# Patient Record
Sex: MALE | Race: WHITE | NOT HISPANIC OR LATINO | Employment: OTHER | URBAN - NONMETROPOLITAN AREA
[De-identification: names, ages, dates, MRNs, and addresses within clinical notes are randomized per-mention and may not be internally consistent; named-entity substitution may affect disease eponyms.]

---

## 2023-02-02 PROBLEM — M25.521 RIGHT ELBOW PAIN: Status: ACTIVE | Noted: 2023-02-02

## 2023-02-02 PROBLEM — M79.645 THUMB PAIN, LEFT: Status: ACTIVE | Noted: 2023-02-02

## 2023-02-02 PROBLEM — Z95.810 CARDIAC DEFIBRILLATOR IN PLACE: Status: ACTIVE | Noted: 2023-02-02

## 2023-02-02 PROBLEM — R06.2 WHEEZING: Status: ACTIVE | Noted: 2023-02-02

## 2023-02-02 PROBLEM — L40.9 PSORIASIS: Status: ACTIVE | Noted: 2023-02-02

## 2023-02-02 PROBLEM — R35.0 INCREASED URINARY FREQUENCY: Status: ACTIVE | Noted: 2023-02-02

## 2023-02-02 PROBLEM — N32.81 OVERACTIVE BLADDER: Status: ACTIVE | Noted: 2023-02-02

## 2023-02-02 PROBLEM — I10 BENIGN HYPERTENSION: Status: ACTIVE | Noted: 2023-02-02

## 2023-02-02 PROBLEM — K13.0 LIP ULCERATION: Status: ACTIVE | Noted: 2023-02-02

## 2023-02-02 PROBLEM — M25.551 HIP PAIN, RIGHT: Status: ACTIVE | Noted: 2023-02-02

## 2023-02-02 PROBLEM — R39.9 URINARY SYMPTOM OR SIGN: Status: ACTIVE | Noted: 2023-02-02

## 2023-02-02 PROBLEM — M72.2 PLANTAR FASCIITIS: Status: ACTIVE | Noted: 2023-02-02

## 2023-02-02 PROBLEM — R06.02 SOB (SHORTNESS OF BREATH) ON EXERTION: Status: ACTIVE | Noted: 2023-02-02

## 2023-02-02 PROBLEM — L28.0 LICHENOID DERMATITIS: Status: ACTIVE | Noted: 2023-02-02

## 2023-02-02 PROBLEM — E78.5 DYSLIPIDEMIA: Status: ACTIVE | Noted: 2023-02-02

## 2023-02-02 PROBLEM — R25.2 MUSCLE CRAMPS: Status: ACTIVE | Noted: 2023-02-02

## 2023-02-02 PROBLEM — I50.9 CONGESTIVE HEART FAILURE (MULTI): Status: ACTIVE | Noted: 2023-02-02

## 2023-02-02 PROBLEM — H20.00 ACUTE IRITIS: Status: ACTIVE | Noted: 2023-02-02

## 2023-02-02 PROBLEM — R23.3 EASY BRUISING: Status: ACTIVE | Noted: 2023-02-02

## 2023-02-02 PROBLEM — L30.9 DERMATITIS: Status: ACTIVE | Noted: 2023-02-02

## 2023-02-02 PROBLEM — K21.9 GASTROESOPHAGEAL REFLUX DISEASE: Status: ACTIVE | Noted: 2023-02-02

## 2023-02-02 PROBLEM — Z95.810 S/P INTERNAL CARDIAC DEFIBRILLATOR PROCEDURE: Status: ACTIVE | Noted: 2023-02-02

## 2023-02-02 PROBLEM — H57.9 EYE PROBLEM: Status: ACTIVE | Noted: 2023-02-02

## 2023-02-02 PROBLEM — Z85.828 HISTORY OF BASAL CELL CARCINOMA OF SKIN: Status: ACTIVE | Noted: 2023-02-02

## 2023-02-02 PROBLEM — H33.20 RETINAL DETACHMENT: Status: ACTIVE | Noted: 2023-02-02

## 2023-02-02 PROBLEM — I42.8 NONISCHEMIC CARDIOMYOPATHY (MULTI): Status: ACTIVE | Noted: 2023-02-02

## 2023-02-02 PROBLEM — R21 SKIN RASH: Status: ACTIVE | Noted: 2023-02-02

## 2023-02-02 PROBLEM — E66.3 OVERWEIGHT WITH BODY MASS INDEX (BMI) OF 28 TO 28.9 IN ADULT: Status: ACTIVE | Noted: 2023-02-02

## 2023-02-02 PROBLEM — K13.0 CHAPPED LIPS: Status: ACTIVE | Noted: 2023-02-02

## 2023-02-02 PROBLEM — M77.9 TENDONITIS: Status: ACTIVE | Noted: 2023-02-02

## 2023-02-02 RX ORDER — OMEPRAZOLE 20 MG/1
CAPSULE, DELAYED RELEASE ORAL
COMMUNITY

## 2023-02-02 RX ORDER — PREDNISOLONE ACETATE 10 MG/ML
SUSPENSION/ DROPS OPHTHALMIC
COMMUNITY

## 2023-02-02 RX ORDER — DIGOXIN 125 MCG
1 TABLET ORAL DAILY
COMMUNITY
Start: 2013-09-05

## 2023-02-02 RX ORDER — ASPIRIN 81 MG/1
1 TABLET ORAL DAILY
COMMUNITY

## 2023-02-02 RX ORDER — CARVEDILOL 12.5 MG/1
1 TABLET ORAL
COMMUNITY

## 2023-02-02 RX ORDER — AMMONIUM LACTATE 12 G/100G
LOTION TOPICAL
COMMUNITY

## 2023-02-02 RX ORDER — CYCLOPENTOLATE HYDROCHLORIDE 10 MG/ML
SOLUTION/ DROPS OPHTHALMIC
COMMUNITY

## 2023-02-02 RX ORDER — TACROLIMUS 1 MG/G
OINTMENT TOPICAL
COMMUNITY
Start: 2021-11-23

## 2023-02-02 RX ORDER — EZETIMIBE 10 MG/1
1 TABLET ORAL DAILY
COMMUNITY

## 2023-02-02 RX ORDER — FLUOCINONIDE 0.5 MG/G
OINTMENT TOPICAL
COMMUNITY
Start: 2021-11-23

## 2023-02-02 RX ORDER — SPIRONOLACTONE 50 MG/1
1 TABLET, FILM COATED ORAL DAILY
COMMUNITY
Start: 2018-09-17 | End: 2024-01-05 | Stop reason: ALTCHOICE

## 2023-02-02 RX ORDER — ALBUTEROL SULFATE 90 UG/1
AEROSOL, METERED RESPIRATORY (INHALATION)
COMMUNITY
Start: 2020-05-12

## 2023-02-02 RX ORDER — MULTIVITAMIN
TABLET ORAL
COMMUNITY

## 2023-02-02 RX ORDER — CEPHALEXIN 500 MG/1
500 CAPSULE ORAL
COMMUNITY
End: 2023-06-01 | Stop reason: ALTCHOICE

## 2023-03-16 ENCOUNTER — OFFICE VISIT (OUTPATIENT)
Dept: PRIMARY CARE | Facility: CLINIC | Age: 83
End: 2023-03-16
Payer: MEDICARE

## 2023-03-16 VITALS
BODY MASS INDEX: 28.79 KG/M2 | TEMPERATURE: 97.6 F | HEART RATE: 67 BPM | WEIGHT: 168.6 LBS | OXYGEN SATURATION: 95 % | HEIGHT: 64 IN | SYSTOLIC BLOOD PRESSURE: 110 MMHG | DIASTOLIC BLOOD PRESSURE: 60 MMHG

## 2023-03-16 DIAGNOSIS — I50.9 CONGESTIVE HEART FAILURE, UNSPECIFIED HF CHRONICITY, UNSPECIFIED HEART FAILURE TYPE (MULTI): ICD-10-CM

## 2023-03-16 DIAGNOSIS — H20.00 ACUTE IRITIS: ICD-10-CM

## 2023-03-16 DIAGNOSIS — Z00.00 MEDICARE ANNUAL WELLNESS VISIT, SUBSEQUENT: Primary | ICD-10-CM

## 2023-03-16 PROBLEM — R23.3 EASY BRUISING: Status: RESOLVED | Noted: 2023-02-02 | Resolved: 2023-03-16

## 2023-03-16 PROBLEM — Z95.810 S/P INTERNAL CARDIAC DEFIBRILLATOR PROCEDURE: Status: RESOLVED | Noted: 2023-02-02 | Resolved: 2023-03-16

## 2023-03-16 PROBLEM — R39.9 URINARY SYMPTOM OR SIGN: Status: RESOLVED | Noted: 2023-02-02 | Resolved: 2023-03-16

## 2023-03-16 PROBLEM — R35.0 INCREASED URINARY FREQUENCY: Status: RESOLVED | Noted: 2023-02-02 | Resolved: 2023-03-16

## 2023-03-16 PROBLEM — R21 SKIN RASH: Status: RESOLVED | Noted: 2023-02-02 | Resolved: 2023-03-16

## 2023-03-16 PROBLEM — R06.2 WHEEZING: Status: RESOLVED | Noted: 2023-02-02 | Resolved: 2023-03-16

## 2023-03-16 PROBLEM — M79.645 THUMB PAIN, LEFT: Status: RESOLVED | Noted: 2023-02-02 | Resolved: 2023-03-16

## 2023-03-16 PROBLEM — M25.521 RIGHT ELBOW PAIN: Status: RESOLVED | Noted: 2023-02-02 | Resolved: 2023-03-16

## 2023-03-16 PROBLEM — N32.81 OVERACTIVE BLADDER: Status: RESOLVED | Noted: 2023-02-02 | Resolved: 2023-03-16

## 2023-03-16 PROBLEM — R06.02 SOB (SHORTNESS OF BREATH) ON EXERTION: Status: RESOLVED | Noted: 2023-02-02 | Resolved: 2023-03-16

## 2023-03-16 PROBLEM — L28.0 LICHENOID DERMATITIS: Status: RESOLVED | Noted: 2023-02-02 | Resolved: 2023-03-16

## 2023-03-16 PROBLEM — M72.2 PLANTAR FASCIITIS: Status: RESOLVED | Noted: 2023-02-02 | Resolved: 2023-03-16

## 2023-03-16 PROBLEM — M77.9 TENDONITIS: Status: RESOLVED | Noted: 2023-02-02 | Resolved: 2023-03-16

## 2023-03-16 PROBLEM — E66.3 OVERWEIGHT WITH BODY MASS INDEX (BMI) OF 28 TO 28.9 IN ADULT: Status: RESOLVED | Noted: 2023-02-02 | Resolved: 2023-03-16

## 2023-03-16 PROBLEM — M25.551 HIP PAIN, RIGHT: Status: RESOLVED | Noted: 2023-02-02 | Resolved: 2023-03-16

## 2023-03-16 PROBLEM — K13.0 LIP ULCERATION: Status: RESOLVED | Noted: 2023-02-02 | Resolved: 2023-03-16

## 2023-03-16 PROBLEM — K13.0 CHAPPED LIPS: Status: RESOLVED | Noted: 2023-02-02 | Resolved: 2023-03-16

## 2023-03-16 PROBLEM — H57.9 EYE PROBLEM: Status: RESOLVED | Noted: 2023-02-02 | Resolved: 2023-03-16

## 2023-03-16 PROBLEM — R25.2 MUSCLE CRAMPS: Status: RESOLVED | Noted: 2023-02-02 | Resolved: 2023-03-16

## 2023-03-16 PROBLEM — L30.9 DERMATITIS: Status: RESOLVED | Noted: 2023-02-02 | Resolved: 2023-03-16

## 2023-03-16 PROCEDURE — 1170F FXNL STATUS ASSESSED: CPT | Performed by: FAMILY MEDICINE

## 2023-03-16 PROCEDURE — 3078F DIAST BP <80 MM HG: CPT | Performed by: FAMILY MEDICINE

## 2023-03-16 PROCEDURE — 3074F SYST BP LT 130 MM HG: CPT | Performed by: FAMILY MEDICINE

## 2023-03-16 PROCEDURE — G0439 PPPS, SUBSEQ VISIT: HCPCS | Performed by: FAMILY MEDICINE

## 2023-03-16 PROCEDURE — 1036F TOBACCO NON-USER: CPT | Performed by: FAMILY MEDICINE

## 2023-03-16 RX ORDER — EVOLOCUMAB 140 MG/ML
140 INJECTION, SOLUTION SUBCUTANEOUS
COMMUNITY
Start: 2023-02-25

## 2023-03-16 ASSESSMENT — ENCOUNTER SYMPTOMS
DEPRESSION: 0
OCCASIONAL FEELINGS OF UNSTEADINESS: 0
LOSS OF SENSATION IN FEET: 0

## 2023-03-16 ASSESSMENT — ACTIVITIES OF DAILY LIVING (ADL)
BATHING: INDEPENDENT
TAKING_MEDICATION: INDEPENDENT
GROCERY_SHOPPING: INDEPENDENT
DRESSING: INDEPENDENT
DOING_HOUSEWORK: INDEPENDENT

## 2023-04-17 ENCOUNTER — OFFICE VISIT (OUTPATIENT)
Dept: PRIMARY CARE | Facility: CLINIC | Age: 83
End: 2023-04-17
Payer: MEDICARE

## 2023-04-17 VITALS
TEMPERATURE: 97 F | DIASTOLIC BLOOD PRESSURE: 70 MMHG | HEART RATE: 96 BPM | HEIGHT: 64 IN | SYSTOLIC BLOOD PRESSURE: 134 MMHG | BODY MASS INDEX: 29.33 KG/M2 | OXYGEN SATURATION: 99 % | WEIGHT: 171.8 LBS

## 2023-04-17 DIAGNOSIS — R19.7 DIARRHEA OF PRESUMED INFECTIOUS ORIGIN: Primary | ICD-10-CM

## 2023-04-17 PROCEDURE — 3075F SYST BP GE 130 - 139MM HG: CPT | Performed by: FAMILY MEDICINE

## 2023-04-17 PROCEDURE — 99212 OFFICE O/P EST SF 10 MIN: CPT | Performed by: FAMILY MEDICINE

## 2023-04-17 PROCEDURE — 3078F DIAST BP <80 MM HG: CPT | Performed by: FAMILY MEDICINE

## 2023-04-17 PROCEDURE — 1036F TOBACCO NON-USER: CPT | Performed by: FAMILY MEDICINE

## 2023-04-17 ASSESSMENT — PATIENT HEALTH QUESTIONNAIRE - PHQ9
SUM OF ALL RESPONSES TO PHQ9 QUESTIONS 1 AND 2: 0
2. FEELING DOWN, DEPRESSED OR HOPELESS: NOT AT ALL
1. LITTLE INTEREST OR PLEASURE IN DOING THINGS: NOT AT ALL

## 2023-04-17 ASSESSMENT — ENCOUNTER SYMPTOMS
DEPRESSION: 0
LOSS OF SENSATION IN FEET: 0
OCCASIONAL FEELINGS OF UNSTEADINESS: 0

## 2023-04-17 NOTE — PROGRESS NOTES
"Subjective   Patient ID: Terrance Armijo is a 82 y.o. male who presents for GI Problem (/Stomache issues  [id number]/Really soft stools,/Taking pepto).    HPI  Here for urgent visit  Has been having upset stomach x 2 weeks with loose stools, going almost 2-3 times a day. Tried pepto today and that has helped  Has also been burping and gassy more.   No new medications, food  No fever.   No abdominal pain, no nausea/vomiting      Review of Systems  General: no fever  Eyes: no blurry vision  ENT: no sore throat, no ear pain  Resp: no cough, sob or wheezing  Cardio: no chest pain, no palpitations  Abd: see HPI  : no dysuria, no increased urinary frequency      /70   Pulse 96   Temp 36.1 °C (97 °F)   Ht 1.613 m (5' 3.5\")   Wt 77.9 kg (171 lb 12.8 oz)   SpO2 99%   BMI 29.96 kg/m²       Objective   Physical Exam  Gen: NAD, alert  Head: normocephalic/atraumatic  Eyes: conjunctivae normal  Ears: canals clear bilaterally, TM normal   Nose: Deferred due to covid precautions, wearing mask  Oropharynx: Deferred due to covid precautions, wearing mask  Resp: Clear to auscultation  CVS: Regular rate and rhythm  Abdomen: soft, NT, ND  Ext: no edema, NT of lower extremities  Neuro: gait normal     Assessment/Plan   Problem List Items Addressed This Visit    None  Visit Diagnoses       Diarrhea of presumed infectious origin    -  Primary    Relevant Orders    Stool Pathogen Panel, PCR (Completed)    C. difficile, PCR (Completed)    Basic Metabolic Panel (Completed)    Magnesium (Completed)               "

## 2023-04-17 NOTE — PATIENT INSTRUCTIONS
Please follow up in 3 months  Please take your medications as prescribed  Please get your stool test

## 2023-04-18 ENCOUNTER — LAB (OUTPATIENT)
Dept: LAB | Facility: LAB | Age: 83
End: 2023-04-18
Payer: MEDICARE

## 2023-04-18 DIAGNOSIS — R19.7 DIARRHEA OF PRESUMED INFECTIOUS ORIGIN: ICD-10-CM

## 2023-04-18 LAB
ANION GAP IN SER/PLAS: 11 MMOL/L (ref 10–20)
C. DIFFICILE TOXIN, PCR: NOT DETECTED
CALCIUM (MG/DL) IN SER/PLAS: 9.1 MG/DL (ref 8.6–10.3)
CAMPYLOBACTER GP: NOT DETECTED
CARBON DIOXIDE, TOTAL (MMOL/L) IN SER/PLAS: 26 MMOL/L (ref 21–32)
CHLORIDE (MMOL/L) IN SER/PLAS: 101 MMOL/L (ref 98–107)
CREATININE (MG/DL) IN SER/PLAS: 1.23 MG/DL (ref 0.5–1.3)
GFR MALE: 58 ML/MIN/1.73M2
GLUCOSE (MG/DL) IN SER/PLAS: 106 MG/DL (ref 74–99)
MAGNESIUM (MG/DL) IN SER/PLAS: 1.91 MG/DL (ref 1.6–2.4)
NOROVIRUS GI/GII: NOT DETECTED
POTASSIUM (MMOL/L) IN SER/PLAS: 4.3 MMOL/L (ref 3.5–5.3)
ROTAVIRUS A: NOT DETECTED
SALMONELLA SP.: NOT DETECTED
SHIGA TOXIN 1: NOT DETECTED
SHIGA TOXIN 2: NOT DETECTED
SHIGELLA SP.: NOT DETECTED
SODIUM (MMOL/L) IN SER/PLAS: 134 MMOL/L (ref 136–145)
UREA NITROGEN (MG/DL) IN SER/PLAS: 15 MG/DL (ref 6–23)
VIBRIO GRP.: NOT DETECTED
YERSINIA ENTEROCOLITICA: NOT DETECTED

## 2023-04-18 PROCEDURE — 83735 ASSAY OF MAGNESIUM: CPT

## 2023-04-18 PROCEDURE — 36415 COLL VENOUS BLD VENIPUNCTURE: CPT

## 2023-04-18 PROCEDURE — 87506 IADNA-DNA/RNA PROBE TQ 6-11: CPT

## 2023-04-18 PROCEDURE — 80048 BASIC METABOLIC PNL TOTAL CA: CPT

## 2023-04-18 PROCEDURE — 87493 C DIFF AMPLIFIED PROBE: CPT

## 2023-06-01 ENCOUNTER — OFFICE VISIT (OUTPATIENT)
Dept: PRIMARY CARE | Facility: CLINIC | Age: 83
End: 2023-06-01
Payer: MEDICARE

## 2023-06-01 VITALS
TEMPERATURE: 99.2 F | HEART RATE: 98 BPM | SYSTOLIC BLOOD PRESSURE: 124 MMHG | DIASTOLIC BLOOD PRESSURE: 70 MMHG | HEIGHT: 64 IN | BODY MASS INDEX: 28.44 KG/M2 | WEIGHT: 166.6 LBS | OXYGEN SATURATION: 93 %

## 2023-06-01 DIAGNOSIS — R05.1 ACUTE COUGH: Primary | ICD-10-CM

## 2023-06-01 DIAGNOSIS — J20.9 ACUTE BRONCHITIS, UNSPECIFIED ORGANISM: ICD-10-CM

## 2023-06-01 PROCEDURE — 87635 SARS-COV-2 COVID-19 AMP PRB: CPT

## 2023-06-01 PROCEDURE — 3078F DIAST BP <80 MM HG: CPT | Performed by: FAMILY MEDICINE

## 2023-06-01 PROCEDURE — 3074F SYST BP LT 130 MM HG: CPT | Performed by: FAMILY MEDICINE

## 2023-06-01 PROCEDURE — 1159F MED LIST DOCD IN RCRD: CPT | Performed by: FAMILY MEDICINE

## 2023-06-01 PROCEDURE — 1160F RVW MEDS BY RX/DR IN RCRD: CPT | Performed by: FAMILY MEDICINE

## 2023-06-01 PROCEDURE — 1036F TOBACCO NON-USER: CPT | Performed by: FAMILY MEDICINE

## 2023-06-01 PROCEDURE — 99213 OFFICE O/P EST LOW 20 MIN: CPT | Performed by: FAMILY MEDICINE

## 2023-06-01 RX ORDER — BENZONATATE 100 MG/1
100 CAPSULE ORAL 3 TIMES DAILY PRN
Qty: 30 CAPSULE | Refills: 0 | Status: SHIPPED | OUTPATIENT
Start: 2023-06-01 | End: 2023-07-01

## 2023-06-01 RX ORDER — AMOXICILLIN AND CLAVULANATE POTASSIUM 875; 125 MG/1; MG/1
875 TABLET, FILM COATED ORAL 2 TIMES DAILY
Qty: 20 TABLET | Refills: 0 | Status: SHIPPED | OUTPATIENT
Start: 2023-06-01 | End: 2023-06-11

## 2023-06-01 ASSESSMENT — PATIENT HEALTH QUESTIONNAIRE - PHQ9
1. LITTLE INTEREST OR PLEASURE IN DOING THINGS: NOT AT ALL
2. FEELING DOWN, DEPRESSED OR HOPELESS: NOT AT ALL
SUM OF ALL RESPONSES TO PHQ9 QUESTIONS 1 AND 2: 0

## 2023-06-01 ASSESSMENT — ENCOUNTER SYMPTOMS
LOSS OF SENSATION IN FEET: 0
DEPRESSION: 0
OCCASIONAL FEELINGS OF UNSTEADINESS: 0

## 2023-06-01 NOTE — PROGRESS NOTES
"Subjective   Patient ID: Terrance Armijo is a 82 y.o. male who presents for Cough and Sinusitis (Started 3 days ago, wore out , tired).  HPI  Here with significant cough, sob x 3 days, fatigued, sinus pressure, low grade fever. O2 - 93% today, usually better   Did not check for covid  Has body aches, no nausea/vomiting/diarrhea    Review of Systems  General: no fever  Eyes: no blurry vision  ENT: no sore throat, no ear pain  Resp:see HPI  Cardio: no chest pain, no palpitations  Abd: no nausea/vomiting  : no dysuria, no increased urinary frequency      /70   Pulse 98   Temp 37.3 °C (99.2 °F)   Ht 1.613 m (5' 3.5\")   Wt 75.6 kg (166 lb 9.6 oz)   SpO2 93%   BMI 29.05 kg/m²       Objective   Physical Exam  Gen: NAD, alert  Head: normocephalic/atraumatic  Eyes: conjunctivae normal  Ears: canals clear bilaterally, TM normal   Nose: external nose normal   Oropharynx: clear   Resp: decreased breath sounds  CVS: Regular rate and rhythm  Abdomen: soft, NT, ND  Ext: no edema, NT of lower extremities         Assessment/Plan   Problem List Items Addressed This Visit    None  Visit Diagnoses       Acute cough    -  Primary    Relevant Orders    Sars-CoV-2 PCR, Symptomatic    Acute bronchitis, unspecified organism        Relevant Medications    Concerned for pneumonia given patient's physical exam, will treat amoxicillin-pot clavulanate (Augmentin) 875-125 mg tablet               "

## 2023-06-02 LAB — SARS-COV-2 RESULT: NOT DETECTED

## 2023-06-22 ENCOUNTER — PROCEDURE VISIT (OUTPATIENT)
Dept: PRIMARY CARE | Facility: CLINIC | Age: 83
End: 2023-06-22
Payer: MEDICARE

## 2023-06-22 VITALS
WEIGHT: 157.8 LBS | HEART RATE: 63 BPM | OXYGEN SATURATION: 94 % | TEMPERATURE: 96 F | DIASTOLIC BLOOD PRESSURE: 72 MMHG | BODY MASS INDEX: 26.94 KG/M2 | SYSTOLIC BLOOD PRESSURE: 116 MMHG | HEIGHT: 64 IN

## 2023-06-22 DIAGNOSIS — I10 BENIGN HYPERTENSION: ICD-10-CM

## 2023-06-22 DIAGNOSIS — R05.9 COUGH, UNSPECIFIED TYPE: Primary | ICD-10-CM

## 2023-06-22 PROCEDURE — 99212 OFFICE O/P EST SF 10 MIN: CPT | Performed by: FAMILY MEDICINE

## 2023-06-22 RX ORDER — FUROSEMIDE 40 MG/1
40 TABLET ORAL DAILY
COMMUNITY
End: 2024-01-05 | Stop reason: ALTCHOICE

## 2023-06-22 ASSESSMENT — PATIENT HEALTH QUESTIONNAIRE - PHQ9
SUM OF ALL RESPONSES TO PHQ9 QUESTIONS 1 AND 2: 0
1. LITTLE INTEREST OR PLEASURE IN DOING THINGS: NOT AT ALL
2. FEELING DOWN, DEPRESSED OR HOPELESS: NOT AT ALL

## 2023-06-22 ASSESSMENT — ENCOUNTER SYMPTOMS
OCCASIONAL FEELINGS OF UNSTEADINESS: 0
DEPRESSION: 0
LOSS OF SENSATION IN FEET: 0

## 2023-06-22 NOTE — PATIENT INSTRUCTIONS
Please follow up in 3 months  Please take your medications as prescribed  Please get your chest xray done

## 2023-06-22 NOTE — PROGRESS NOTES
"Subjective   Patient ID: Terrance Armijo is a 83 y.o. male who presents for Follow-up (Still coughing off and on).  HPI  Here for follow up  Finished augmentin, feeling better but still having cough off and on.   No fever.   HTN under control with meds    Review of Systems  General: no fever  Eyes: no blurry vision  ENT: no sore throat, no ear pain  Resp: see HPI  Cardio: no chest pain, no palpitations  Abd: no nausea/vomiting  : no dysuria, no increased urinary frequency      /72   Pulse 63   Temp 35.6 °C (96 °F)   Ht 1.613 m (5' 3.5\")   Wt 71.6 kg (157 lb 12.8 oz)   SpO2 94%   BMI 27.51 kg/m²       Objective   Physical Exam  Gen: NAD, alert  Head: normocephalic/atraumatic  Eyes: conjunctivae normal  Ears: canals clear bilaterally, TM normal   Nose: external nose normal   Resp: Clear to auscultation  CVS: Regular rate and rhythm  Abdomen: soft, NT, ND  Ext: no edema, NT of lower extremities  Neuro: gait normal     Assessment/Plan   Problem List Items Addressed This Visit       Benign hypertension     Other Visit Diagnoses       Cough, unspecified type    -  Primary    Relevant Orders    XR chest 2 views               "

## 2023-06-29 ENCOUNTER — TELEPHONE (OUTPATIENT)
Dept: PRIMARY CARE | Facility: CLINIC | Age: 83
End: 2023-06-29
Payer: MEDICARE

## 2023-06-29 NOTE — TELEPHONE ENCOUNTER
----- Message from Isaura Chacko MD sent at 6/29/2023 10:50 AM EDT -----  Hi Can you please let Terrance know that his chest xray was normal. No pneumonia seen  Thanks  Dr. Kellogg  Provided patient with xray results, pt was starting to feel better caj 6/29/23

## 2023-09-12 DIAGNOSIS — H54.7 VISION PROBLEMS: Primary | ICD-10-CM

## 2023-09-29 ENCOUNTER — PROCEDURE VISIT (OUTPATIENT)
Dept: PRIMARY CARE | Facility: CLINIC | Age: 83
End: 2023-09-29
Payer: MEDICARE

## 2023-09-29 VITALS
TEMPERATURE: 97 F | BODY MASS INDEX: 25.52 KG/M2 | SYSTOLIC BLOOD PRESSURE: 107 MMHG | DIASTOLIC BLOOD PRESSURE: 67 MMHG | HEIGHT: 64 IN | WEIGHT: 149.5 LBS

## 2023-09-29 DIAGNOSIS — Z23 NEED FOR INFLUENZA VACCINATION: ICD-10-CM

## 2023-09-29 DIAGNOSIS — R53.82 CHRONIC FATIGUE: ICD-10-CM

## 2023-09-29 DIAGNOSIS — R35.0 INCREASED URINARY FREQUENCY: Primary | ICD-10-CM

## 2023-09-29 DIAGNOSIS — E78.5 DYSLIPIDEMIA: ICD-10-CM

## 2023-09-29 DIAGNOSIS — E55.9 VITAMIN D DEFICIENCY: ICD-10-CM

## 2023-09-29 PROBLEM — D48.5 NEOPLASM OF UNCERTAIN BEHAVIOR OF SKIN: Status: RESOLVED | Noted: 2022-10-12 | Resolved: 2023-09-29

## 2023-09-29 PROBLEM — D22.5 MELANOCYTIC NEVI OF TRUNK: Status: RESOLVED | Noted: 2022-10-12 | Resolved: 2023-09-29

## 2023-09-29 LAB
POC APPEARANCE, URINE: CLEAR
POC BILIRUBIN, URINE: NEGATIVE
POC BLOOD, URINE: NEGATIVE
POC COLOR, URINE: NORMAL
POC GLUCOSE, URINE: NEGATIVE MG/DL
POC KETONES, URINE: NEGATIVE MG/DL
POC LEUKOCYTES, URINE: NEGATIVE
POC NITRITE,URINE: NEGATIVE
POC PH, URINE: 6 PH
POC PROTEIN, URINE: NEGATIVE MG/DL
POC SPECIFIC GRAVITY, URINE: 1.01
POC UROBILINOGEN, URINE: 0.2 EU/DL

## 2023-09-29 PROCEDURE — 99214 OFFICE O/P EST MOD 30 MIN: CPT | Performed by: FAMILY MEDICINE

## 2023-09-29 PROCEDURE — 81003 URINALYSIS AUTO W/O SCOPE: CPT | Performed by: FAMILY MEDICINE

## 2023-09-29 PROCEDURE — 90662 IIV NO PRSV INCREASED AG IM: CPT | Performed by: FAMILY MEDICINE

## 2023-09-29 PROCEDURE — G0008 ADMIN INFLUENZA VIRUS VAC: HCPCS | Performed by: FAMILY MEDICINE

## 2023-09-29 RX ORDER — POTASSIUM CHLORIDE 1500 MG/1
20 TABLET, EXTENDED RELEASE ORAL DAILY
COMMUNITY
Start: 2023-09-05

## 2023-09-29 ASSESSMENT — ENCOUNTER SYMPTOMS
DEPRESSION: 0
OCCASIONAL FEELINGS OF UNSTEADINESS: 0
LOSS OF SENSATION IN FEET: 0

## 2023-09-29 NOTE — PROGRESS NOTES
"Subjective   Patient ID: Terrance Armijo is a 83 y.o. male who presents for Follow-up (Eye problem still going on/Losing weight /Not felt good since last visit /).  HPI  Still following with eye doctor for vision loss  Has been losing weight for the past few months, has decreased appetite, but now has been better. No abdominal pain, no vomiting, no dysphagia. Was started on lasix by cardiologist so not sure if weight loss due to that. Has been feeling tired as well, no issues with sleep. Has been having increased urinary frequency been going on for years, no dysuria.     Review of Systems  General: no fever  Eyes: no blurry vision  ENT: no sore throat, no ear pain  Resp: no cough, sob or wheezing  Cardio: no chest pain, no palpitations  Abd: no nausea/vomiting  : see HPI      /67   Temp 36.1 °C (97 °F)   Ht 1.613 m (5' 3.5\")   Wt 67.3 kg (148 lb 6.4 oz)   BMI 25.88 kg/m²       Objective   Physical Exam  Gen: NAD, alert  Head: normocephalic/atraumatic  Eyes: conjunctivae normal  Ears: canals clear bilaterally, TM normal   Nose: external nose normal   Oropharynx: clear   Resp: Clear to auscultation  CVS: Regular rate and rhythm  Abdomen: soft, NT, ND  Ext: no edema, NT of lower extremities  Neuro: gait normal     Assessment/Plan   Problem List Items Addressed This Visit       Dyslipidemia    Relevant Orders    Lipid Panel    TSH with reflex to Free T4 if abnormal    CBC    Comprehensive Metabolic Panel     Other Visit Diagnoses       Increased urinary frequency    -  Primary    Relevant Orders    POCT UA Automated manually resulted    Vitamin D deficiency        Relevant Orders    Vitamin B12    Vitamin D 25-Hydroxy,Total (for eval of Vitamin D levels)    Need for influenza vaccination        Relevant Orders    Flu vaccine, quadrivalent, high-dose, preservative free, age 65y+ (FLUZONE)    Chronic fatigue      Check TSH, CBC, vitamin D and B12               "

## 2023-09-30 ENCOUNTER — LAB (OUTPATIENT)
Dept: LAB | Facility: LAB | Age: 83
End: 2023-09-30
Payer: MEDICARE

## 2023-09-30 DIAGNOSIS — E78.5 DYSLIPIDEMIA: ICD-10-CM

## 2023-09-30 DIAGNOSIS — E55.9 VITAMIN D DEFICIENCY: ICD-10-CM

## 2023-09-30 LAB
25(OH)D3 SERPL-MCNC: 125 NG/ML (ref 30–100)
ALBUMIN SERPL BCP-MCNC: 4.2 G/DL (ref 3.4–5)
ALP SERPL-CCNC: 31 U/L (ref 33–136)
ALT SERPL W P-5'-P-CCNC: 12 U/L (ref 10–52)
ANION GAP SERPL CALC-SCNC: 14 MMOL/L (ref 10–20)
AST SERPL W P-5'-P-CCNC: 21 U/L (ref 9–39)
BILIRUB SERPL-MCNC: 0.8 MG/DL (ref 0–1.2)
BUN SERPL-MCNC: 37 MG/DL (ref 6–23)
CALCIUM SERPL-MCNC: 9 MG/DL (ref 8.6–10.3)
CHLORIDE SERPL-SCNC: 103 MMOL/L (ref 98–107)
CHOLEST SERPL-MCNC: 142 MG/DL (ref 0–199)
CHOLESTEROL/HDL RATIO: 2.7
CO2 SERPL-SCNC: 24 MMOL/L (ref 21–32)
CREAT SERPL-MCNC: 1.57 MG/DL (ref 0.5–1.3)
ERYTHROCYTE [DISTWIDTH] IN BLOOD BY AUTOMATED COUNT: 13.7 % (ref 11.5–14.5)
GFR SERPL CREATININE-BSD FRML MDRD: 43 ML/MIN/1.73M*2
GLUCOSE SERPL-MCNC: 106 MG/DL (ref 74–99)
HCT VFR BLD AUTO: 45 % (ref 41–52)
HDLC SERPL-MCNC: 52.9 MG/DL
HGB BLD-MCNC: 14.6 G/DL (ref 13.5–17.5)
LDLC SERPL CALC-MCNC: 56 MG/DL (ref 140–190)
MCH RBC QN AUTO: 30.3 PG (ref 26–34)
MCHC RBC AUTO-ENTMCNC: 32.4 G/DL (ref 32–36)
MCV RBC AUTO: 93 FL (ref 80–100)
NON HDL CHOLESTEROL: 89 MG/DL (ref 0–149)
NRBC BLD-RTO: 0 /100 WBCS (ref 0–0)
PLATELET # BLD AUTO: 206 X10*3/UL (ref 150–450)
PMV BLD AUTO: 10.3 FL (ref 7.5–11.5)
POTASSIUM SERPL-SCNC: 4.5 MMOL/L (ref 3.5–5.3)
PROT SERPL-MCNC: 6.7 G/DL (ref 6.4–8.2)
RBC # BLD AUTO: 4.82 X10*6/UL (ref 4.5–5.9)
SODIUM SERPL-SCNC: 136 MMOL/L (ref 136–145)
TRIGL SERPL-MCNC: 164 MG/DL (ref 0–149)
TSH SERPL-ACNC: 1.23 MIU/L (ref 0.44–3.98)
VIT B12 SERPL-MCNC: 460 PG/ML (ref 211–911)
VLDL: 33 MG/DL (ref 0–40)
WBC # BLD AUTO: 6.8 X10*3/UL (ref 4.4–11.3)

## 2023-09-30 PROCEDURE — 36415 COLL VENOUS BLD VENIPUNCTURE: CPT

## 2023-10-03 DIAGNOSIS — N17.9 AKI (ACUTE KIDNEY INJURY) (CMS-HCC): Primary | ICD-10-CM

## 2023-10-11 ENCOUNTER — OFFICE VISIT (OUTPATIENT)
Dept: DERMATOLOGY | Facility: CLINIC | Age: 83
End: 2023-10-11
Payer: MEDICARE

## 2023-10-11 DIAGNOSIS — L81.4 LENTIGO: ICD-10-CM

## 2023-10-11 DIAGNOSIS — L57.0 ACTINIC KERATOSIS: Primary | ICD-10-CM

## 2023-10-11 DIAGNOSIS — L80 VITILIGO: ICD-10-CM

## 2023-10-11 DIAGNOSIS — D18.01 HEMANGIOMA OF SKIN: ICD-10-CM

## 2023-10-11 DIAGNOSIS — L43.0 HYPERTROPHIC LICHEN PLANUS: ICD-10-CM

## 2023-10-11 DIAGNOSIS — D22.9 MULTIPLE BENIGN NEVI: ICD-10-CM

## 2023-10-11 DIAGNOSIS — Z12.83 SCREENING EXAM FOR SKIN CANCER: ICD-10-CM

## 2023-10-11 DIAGNOSIS — L82.1 SEBORRHEIC KERATOSIS: ICD-10-CM

## 2023-10-11 PROCEDURE — 1159F MED LIST DOCD IN RCRD: CPT | Performed by: DERMATOLOGY

## 2023-10-11 PROCEDURE — 99213 OFFICE O/P EST LOW 20 MIN: CPT | Performed by: DERMATOLOGY

## 2023-10-11 PROCEDURE — 1036F TOBACCO NON-USER: CPT | Performed by: DERMATOLOGY

## 2023-10-11 PROCEDURE — 17000 DESTRUCT PREMALG LESION: CPT

## 2023-10-11 PROCEDURE — 17003 DESTRUCT PREMALG LES 2-14: CPT

## 2023-10-11 PROCEDURE — 1160F RVW MEDS BY RX/DR IN RCRD: CPT | Performed by: DERMATOLOGY

## 2023-10-11 NOTE — PATIENT INSTRUCTIONS
Mr. Armijo,    It was great seeing you in Dr. Sanchez's clinic.    We did a full body skin exam today. We noticed two lesions one on your left forehead and one on your scalp that were precancerous lesions (actinic keratosis). We treated those with liquid nitrogen to prevent them from turning into skin cancer (squamous cell carcinoma).     We also discussed your lichen planus (small red bumps behind your knees). If this becomes bothersome, can treat with topical tacrolimus ointment.     Additionally, we believe the light spots on your body are due to an autoimmune condition called vitiligo. We prescribed you topical ruxolitinib to use twice daily.     We will see you in 1 year for your next full body skin exam!

## 2023-10-12 NOTE — PROGRESS NOTES
Subjective     Terrance Armijo is a 83 y.o. male with history of hypertrophic lichen planus who presents for the following: Skin Check.     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 Patient previously treated with ILK, fluocinonide 0.05% ointment up to twice weekly under occlusion, and tacrolimus 0.1% ointment BID PRN with good response. Patient reports using tacrolimus 0.1% ointment one or two times over the last year. He is very happy with his current level of disease control.     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 Patient notes that depigmented areas on his left arm have continued to expand.      Review of Systems:  No other skin or systemic complaints other than what is documented elsewhere in the note.    The following portions of the chart were reviewed this encounter and updated as appropriate:         Skin Cancer History  No skin cancer on file.      Specialty Problems          Dermatology Problems    History of basal cell carcinoma of skin     Of face         Psoriasis        Objective   Well appearing patient in no apparent distress; mood and affect are within normal limits.    A full examination was performed including scalp, head, eyes, ears, nose, lips, neck, chest, axillae, abdomen, back, buttocks, bilateral upper extremities, bilateral lower extremities, hands, feet, fingers, toes, fingernails, and toenails. All findings within normal limits unless otherwise noted below.    Assessment/Plan   1. Actinic keratosis (2)  Left Forehead, Mid Parietal Scalp  Erythematous macules with gritty scale.    Destr of lesion - Left Forehead, Mid Parietal Scalp  Complexity: simple    Destruction method: cryotherapy    Informed consent: discussed and consent obtained    Lesion destroyed using liquid nitrogen: Yes    Cryotherapy cycles:  1  Outcome: patient tolerated procedure well with no complications    Post-procedure details: wound  care instructions given      2. Multiple benign nevi  Scattered, uniform and benign-appearing, regular brown melanocytic papules and macules.    - Discussed benign nature and that no treatment is necessary unless it becomes painful or increases in size. Patient opts for clinical monitoring at this time.     3. Lentigo  Scattered tan macules in sun-exposed areas.    - Discussed benign nature and that no treatment is necessary unless it becomes painful or increases in size. Patient opts for clinical monitoring at this time.     4. Hemangioma of skin  Scattered cherry-red papule(s).    - Discussed benign nature and that no treatment is necessary unless it becomes painful or increases in size. Patient opts for clinical monitoring at this time.     5. Seborrheic keratosis  Stuck on verrucous, tan-brown papules and plaques.      - Discussed benign nature and that no treatment is necessary unless it becomes painful or increases in size. Patient opts for clinical monitoring at this time.     6. Screening exam for skin cancer    Full body skin exam  -No lesions concerning for malignancy on the remainder the skin exam today   - The ugly duckling sign was discussed. Monitor for any skin lesions that are different in color, shape, or size than others on body  -Sun protection was discussed. Recommend SPF 30+, hats with brims, sun protective clothing, and avoiding sun exposure between 10 AM and 2 PM whenever possible  -Recommend regular skin exams or sooner if new or changing lesions       Related Procedures  Follow Up In Dermatology - Established Patient    7. Vitiligo  Depigmented patches on bilateral dorsal hands, bilateral dorsal forearms, chest, back, and bilateral anterior lower legs.       -Discussed that vitiligo is a chronic autoimmune disorder that causes skin to lose pigment/color.   -Patient states these lesions are not bothersome as he typically wears long sleeve clothing to protect his skin from the sun. After  discussion of treatment options, patient opts to try Ruxolitinib.   -Start ruxolitinib 1.5% cream BID. Discussed risks, benefits, and side effects. Patient will try to pick this up at the pharmacy and let us know how expensive this is.       Related Medications  ruxolitinib (Opzelura) 1.5 % cream cream  Apply 1 Application topically 2 times a day.    8. Hypertrophic lichen planus  Left Popliteal Fossa, Right Popliteal Fossa  Flat topped red to violaceous papules     Hypertrophic LP likely secondary to carvedilol vs idiopathic  -Previously treated with ILK, fluocinonide 0.05% ointment up to twice weekly under occlusion, and tacrolimus 0.1% ointment BID PRN.   -Patient reports using tacrolimus 0.1% ointment one or two times over the last year  -Active disease noted today on the bilateral popliteal fossa. Patient states it is asymptomatic and not bothersome. Discussed that patient can use tacrolimus 0.1% ointment BID on these areas if they become bothersome. Patient does not need refills at this time.       RTC in 1 year for FBSE.

## 2023-10-13 NOTE — ADDENDUM NOTE
Addended by: JAXSON CANNON on: 10/12/2023 08:57 PM     Modules accepted: Orders, Level of Service

## 2024-01-05 ENCOUNTER — OFFICE VISIT (OUTPATIENT)
Dept: PRIMARY CARE | Facility: CLINIC | Age: 84
End: 2024-01-05
Payer: MEDICARE

## 2024-01-05 ENCOUNTER — LAB (OUTPATIENT)
Dept: LAB | Facility: LAB | Age: 84
End: 2024-01-05
Payer: MEDICARE

## 2024-01-05 VITALS
DIASTOLIC BLOOD PRESSURE: 67 MMHG | TEMPERATURE: 96.7 F | SYSTOLIC BLOOD PRESSURE: 107 MMHG | BODY MASS INDEX: 27.01 KG/M2 | HEART RATE: 62 BPM | HEIGHT: 64 IN | OXYGEN SATURATION: 96 % | WEIGHT: 158.2 LBS

## 2024-01-05 DIAGNOSIS — M35.00 SICCA SYNDROME (MULTI): ICD-10-CM

## 2024-01-05 DIAGNOSIS — T45.2X4S: ICD-10-CM

## 2024-01-05 DIAGNOSIS — E55.9 VITAMIN D DEFICIENCY, UNSPECIFIED: ICD-10-CM

## 2024-01-05 DIAGNOSIS — Z00.00 MEDICARE ANNUAL WELLNESS VISIT, SUBSEQUENT: Primary | ICD-10-CM

## 2024-01-05 DIAGNOSIS — R53.83 FATIGUE, UNSPECIFIED TYPE: ICD-10-CM

## 2024-01-05 DIAGNOSIS — I50.9 CONGESTIVE HEART FAILURE, UNSPECIFIED HF CHRONICITY, UNSPECIFIED HEART FAILURE TYPE (MULTI): ICD-10-CM

## 2024-01-05 LAB
25(OH)D3 SERPL-MCNC: 73 NG/ML (ref 30–100)
ANION GAP SERPL CALC-SCNC: 13 MMOL/L (ref 10–20)
BUN SERPL-MCNC: 23 MG/DL (ref 6–23)
CALCIUM SERPL-MCNC: 9.2 MG/DL (ref 8.6–10.3)
CENTROMERE B AB SER-ACNC: <0.2 AI
CHLORIDE SERPL-SCNC: 106 MMOL/L (ref 98–107)
CHROMATIN AB SERPL-ACNC: <0.2 AI
CO2 SERPL-SCNC: 21 MMOL/L (ref 21–32)
CREAT SERPL-MCNC: 1.35 MG/DL (ref 0.5–1.3)
DSDNA AB SER-ACNC: 1 IU/ML
ENA JO1 AB SER QL IA: <0.2 AI
ENA RNP AB SER IA-ACNC: <0.2 AI
ENA SCL70 AB SER QL IA: <0.2 AI
ENA SM AB SER IA-ACNC: <0.2 AI
ENA SM+RNP AB SER QL IA: <0.2 AI
ENA SS-A AB SER IA-ACNC: <0.2 AI
ENA SS-B AB SER IA-ACNC: <0.2 AI
ERYTHROCYTE [DISTWIDTH] IN BLOOD BY AUTOMATED COUNT: 13.5 % (ref 11.5–14.5)
GFR SERPL CREATININE-BSD FRML MDRD: 52 ML/MIN/1.73M*2
GLUCOSE SERPL-MCNC: 126 MG/DL (ref 74–99)
HCT VFR BLD AUTO: 41.7 % (ref 41–52)
HGB BLD-MCNC: 13.5 G/DL (ref 13.5–17.5)
MCH RBC QN AUTO: 30.3 PG (ref 26–34)
MCHC RBC AUTO-ENTMCNC: 32.4 G/DL (ref 32–36)
MCV RBC AUTO: 94 FL (ref 80–100)
NRBC BLD-RTO: 0 /100 WBCS (ref 0–0)
PLATELET # BLD AUTO: 205 X10*3/UL (ref 150–450)
POTASSIUM SERPL-SCNC: 4.5 MMOL/L (ref 3.5–5.3)
RBC # BLD AUTO: 4.46 X10*6/UL (ref 4.5–5.9)
RIBOSOMAL P AB SER-ACNC: <0.2 AI
SODIUM SERPL-SCNC: 135 MMOL/L (ref 136–145)
WBC # BLD AUTO: 6.8 X10*3/UL (ref 4.4–11.3)

## 2024-01-05 PROCEDURE — 82306 VITAMIN D 25 HYDROXY: CPT

## 2024-01-05 PROCEDURE — 86038 ANTINUCLEAR ANTIBODIES: CPT

## 2024-01-05 PROCEDURE — 86235 NUCLEAR ANTIGEN ANTIBODY: CPT

## 2024-01-05 PROCEDURE — 99397 PER PM REEVAL EST PAT 65+ YR: CPT | Performed by: FAMILY MEDICINE

## 2024-01-05 PROCEDURE — 3074F SYST BP LT 130 MM HG: CPT | Performed by: FAMILY MEDICINE

## 2024-01-05 PROCEDURE — 36415 COLL VENOUS BLD VENIPUNCTURE: CPT

## 2024-01-05 PROCEDURE — 1159F MED LIST DOCD IN RCRD: CPT | Performed by: FAMILY MEDICINE

## 2024-01-05 PROCEDURE — 1170F FXNL STATUS ASSESSED: CPT | Performed by: FAMILY MEDICINE

## 2024-01-05 PROCEDURE — 86225 DNA ANTIBODY NATIVE: CPT

## 2024-01-05 PROCEDURE — 3078F DIAST BP <80 MM HG: CPT | Performed by: FAMILY MEDICINE

## 2024-01-05 PROCEDURE — G0439 PPPS, SUBSEQ VISIT: HCPCS | Performed by: FAMILY MEDICINE

## 2024-01-05 PROCEDURE — 1036F TOBACCO NON-USER: CPT | Performed by: FAMILY MEDICINE

## 2024-01-05 ASSESSMENT — ACTIVITIES OF DAILY LIVING (ADL)
MANAGING_FINANCES: INDEPENDENT
DRESSING: INDEPENDENT
BATHING: INDEPENDENT
DOING_HOUSEWORK: INDEPENDENT
TAKING_MEDICATION: INDEPENDENT
GROCERY_SHOPPING: INDEPENDENT

## 2024-01-05 ASSESSMENT — ENCOUNTER SYMPTOMS
DEPRESSION: 0
LOSS OF SENSATION IN FEET: 0
OCCASIONAL FEELINGS OF UNSTEADINESS: 0

## 2024-01-05 NOTE — PROGRESS NOTES
"Subjective   Reason for Visit: Terrance Armijo is an 83 y.o. male here for a Medicare Wellness visit.     Past Medical, Surgical, and Family History reviewed and updated in chart.    Reviewed all medications by prescribing practitioner or clinical pharmacist (such as prescriptions, OTCs, herbal therapies and supplements) and documented in the medical record.    HPI  Here for medicare visit  Has been having dry eyes and dry mouth. Is not on lasix anymore was drying him out too much, discontinued by cardiology. Following with Dr. Boss, no sob or LE edema  Had elevated vitamin D, was told to stop his vitamin D, will recheck. Has been fatigued lately. No issue with sleep    Patient Care Team:  Isaura Chacko MD as PCP - General  Isaura Chacko MD as PCP - [employer] PCP     Review of Systems  General: no fever  Eyes: no blurry vision  ENT: no sore throat, no ear pain  Resp: no cough, sob or wheezing  Cardio: no chest pain, no palpitations  Abd: no nausea/vomiting  : no dysuria, no increased urinary frequency    Objective   Vitals:  /67   Pulse 62   Temp 35.9 °C (96.7 °F)   Ht 1.613 m (5' 3.5\")   Wt 71.8 kg (158 lb 3.2 oz)   SpO2 96%   BMI 27.58 kg/m²       Physical Exam  Gen: NAD, alert  Head: normocephalic/atraumatic  Eyes: conjunctivae normal  Ears: canals clear bilaterally, TM normal   Nose: external nose normal   Oropharynx: clear   Resp: Clear to auscultation  CVS: Regular rate and rhythm  Abdomen: soft, NT, ND  Ext: no edema, NT of lower extremities  Neuro: gait normal     Assessment/Plan   Problem List Items Addressed This Visit       Congestive heart failure (CMS/HCC)  Stable      Other Visit Diagnoses       Medicare annual wellness visit, subsequent    -  Primary    Vitamin D overdose, undetermined intent, sequela        Relevant Orders    Vitamin D 25-Hydroxy,Total (for eval of Vitamin D levels) (Completed)    Sicca syndrome (CMS/HCC)        Relevant Orders "    ROSI + AIDEE Panel (Completed)    Fatigue, unspecified type        Relevant Orders    CBC (Completed)    Basic metabolic panel (Completed)    Vitamin D deficiency, unspecified        Relevant Orders    Vitamin D 25-Hydroxy,Total (for eval of Vitamin D levels) (Completed)

## 2024-01-08 LAB
ANA PATTERN: ABNORMAL
ANA SER QL HEP2 SUBST: POSITIVE
ANA TITR SER IF: ABNORMAL {TITER}

## 2024-01-11 ENCOUNTER — TELEPHONE (OUTPATIENT)
Dept: PRIMARY CARE | Facility: CLINIC | Age: 84
End: 2024-01-11
Payer: MEDICARE

## 2024-01-11 NOTE — TELEPHONE ENCOUNTER
He is coming to the office to  his most recent bloodwork results, do you want to notate anything on them prior to me giving them to him tomorrow?

## 2024-05-02 ENCOUNTER — TELEPHONE (OUTPATIENT)
Dept: PRIMARY CARE | Facility: CLINIC | Age: 84
End: 2024-05-02
Payer: MEDICARE

## 2024-05-02 DIAGNOSIS — H54.7 VISION LOSS: Primary | ICD-10-CM

## 2024-05-02 NOTE — TELEPHONE ENCOUNTER
Spoke with lab, since I can't order the FTA abs and VDRL, said the syphillis screen is the testing and if that is positive it will reflex to those orders.   Ordered screen.

## 2024-05-02 NOTE — TELEPHONE ENCOUNTER
Patient saw eye doctor today, they are trying to rule out causes for recurring loss of vision in left eye.  They would like to know if you would order STA ABS and Syphillis Screen, with reflex VDRL.  They will order an MRI.  If ok, pls call Dr. Duncan [phone] and patient

## 2024-05-03 ENCOUNTER — LAB (OUTPATIENT)
Dept: LAB | Facility: LAB | Age: 84
End: 2024-05-03
Payer: MEDICARE

## 2024-05-03 DIAGNOSIS — H54.7 VISION LOSS: ICD-10-CM

## 2024-05-03 DIAGNOSIS — N17.9 AKI (ACUTE KIDNEY INJURY) (CMS-HCC): ICD-10-CM

## 2024-05-03 LAB
ANION GAP SERPL CALC-SCNC: 14 MMOL/L (ref 10–20)
BUN SERPL-MCNC: 34 MG/DL (ref 6–23)
CALCIUM SERPL-MCNC: 9 MG/DL (ref 8.6–10.3)
CHLORIDE SERPL-SCNC: 104 MMOL/L (ref 98–107)
CO2 SERPL-SCNC: 19 MMOL/L (ref 21–32)
CREAT SERPL-MCNC: 1.51 MG/DL (ref 0.5–1.3)
EGFRCR SERPLBLD CKD-EPI 2021: 46 ML/MIN/1.73M*2
GLUCOSE SERPL-MCNC: 88 MG/DL (ref 74–99)
POTASSIUM SERPL-SCNC: 4.6 MMOL/L (ref 3.5–5.3)
SODIUM SERPL-SCNC: 132 MMOL/L (ref 136–145)

## 2024-05-03 PROCEDURE — 86780 TREPONEMA PALLIDUM: CPT

## 2024-05-03 PROCEDURE — 36415 COLL VENOUS BLD VENIPUNCTURE: CPT

## 2024-05-04 LAB — TREPONEMA PALLIDUM IGG+IGM AB [PRESENCE] IN SERUM OR PLASMA BY IMMUNOASSAY: NONREACTIVE

## 2024-05-21 DIAGNOSIS — E87.1 HYPONATREMIA: Primary | ICD-10-CM

## 2024-06-20 ENCOUNTER — LAB (OUTPATIENT)
Dept: LAB | Facility: LAB | Age: 84
End: 2024-06-20
Payer: MEDICARE

## 2024-06-20 DIAGNOSIS — E87.1 HYPONATREMIA: ICD-10-CM

## 2024-06-20 LAB
ANION GAP SERPL CALC-SCNC: 11 MMOL/L (ref 10–20)
BUN SERPL-MCNC: 30 MG/DL (ref 6–23)
CALCIUM SERPL-MCNC: 9.5 MG/DL (ref 8.6–10.3)
CHLORIDE SERPL-SCNC: 105 MMOL/L (ref 98–107)
CO2 SERPL-SCNC: 22 MMOL/L (ref 21–32)
CREAT SERPL-MCNC: 1.38 MG/DL (ref 0.5–1.3)
EGFRCR SERPLBLD CKD-EPI 2021: 50 ML/MIN/1.73M*2
GLUCOSE SERPL-MCNC: 99 MG/DL (ref 74–99)
POTASSIUM SERPL-SCNC: 4.5 MMOL/L (ref 3.5–5.3)
SODIUM SERPL-SCNC: 133 MMOL/L (ref 136–145)

## 2024-06-20 PROCEDURE — 36415 COLL VENOUS BLD VENIPUNCTURE: CPT

## 2024-07-10 ENCOUNTER — APPOINTMENT (OUTPATIENT)
Dept: PRIMARY CARE | Facility: CLINIC | Age: 84
End: 2024-07-10
Payer: MEDICARE

## 2024-07-10 VITALS
WEIGHT: 150.8 LBS | SYSTOLIC BLOOD PRESSURE: 144 MMHG | OXYGEN SATURATION: 95 % | HEIGHT: 64 IN | HEART RATE: 71 BPM | BODY MASS INDEX: 25.74 KG/M2 | DIASTOLIC BLOOD PRESSURE: 82 MMHG | TEMPERATURE: 97.2 F

## 2024-07-10 DIAGNOSIS — J20.9 ACUTE BRONCHITIS, UNSPECIFIED ORGANISM: Primary | ICD-10-CM

## 2024-07-10 DIAGNOSIS — I10 BENIGN HYPERTENSION: ICD-10-CM

## 2024-07-10 DIAGNOSIS — E78.5 DYSLIPIDEMIA: ICD-10-CM

## 2024-07-10 PROCEDURE — 99214 OFFICE O/P EST MOD 30 MIN: CPT | Performed by: FAMILY MEDICINE

## 2024-07-10 PROCEDURE — 3077F SYST BP >= 140 MM HG: CPT | Performed by: FAMILY MEDICINE

## 2024-07-10 PROCEDURE — 1036F TOBACCO NON-USER: CPT | Performed by: FAMILY MEDICINE

## 2024-07-10 PROCEDURE — 3079F DIAST BP 80-89 MM HG: CPT | Performed by: FAMILY MEDICINE

## 2024-07-10 PROCEDURE — 1157F ADVNC CARE PLAN IN RCRD: CPT | Performed by: FAMILY MEDICINE

## 2024-07-10 RX ORDER — SPIRONOLACTONE 50 MG/1
50 TABLET, FILM COATED ORAL DAILY
COMMUNITY

## 2024-07-10 RX ORDER — AMOXICILLIN AND CLAVULANATE POTASSIUM 875; 125 MG/1; MG/1
875 TABLET, FILM COATED ORAL 2 TIMES DAILY
Qty: 20 TABLET | Refills: 0 | Status: SHIPPED | OUTPATIENT
Start: 2024-07-10 | End: 2024-07-20

## 2024-07-10 RX ORDER — BENZONATATE 100 MG/1
100 CAPSULE ORAL 3 TIMES DAILY PRN
Qty: 30 CAPSULE | Refills: 0 | Status: SHIPPED | OUTPATIENT
Start: 2024-07-10 | End: 2024-08-09

## 2024-07-10 ASSESSMENT — ENCOUNTER SYMPTOMS
OCCASIONAL FEELINGS OF UNSTEADINESS: 0
DEPRESSION: 0
LOSS OF SENSATION IN FEET: 0

## 2024-07-10 NOTE — PROGRESS NOTES
"Subjective   Patient ID: Terrance Armijo is a 84 y.o. male who presents for Follow-up (He has an air condition condition, has cough and sinus drainage/Not sleeping very well/Wants lab results /Feels a little wobbly sometimes no special time), Cough, Sinusitis, and Dry Mouth.    HPI  Here for follow up   Bp elevated today, usually under control, just took his meds before coming in.   Has been having cough x 1 week, no fever, no sob or wheezing, no sinus pressure and headaches. No nausea/vomiting/diarrhea. Using coricidin, not helping, getting worse. Has been feeling unsteady sometimes since he has been sick, but admits also that he does not drink enough fluids.     Review of Systems  As per HPI    /82   Pulse 71   Temp 36.2 °C (97.2 °F)   Ht 1.613 m (5' 3.5\")   Wt 68.4 kg (150 lb 12.8 oz)   SpO2 95%   BMI 26.29 kg/m²       Objective   Physical Exam  Gen: NAD, alert  Head: normocephalic/atraumatic  Eyes: conjunctivae normal  Ears: canals clear bilaterally, TM normal   Nose: external nose normal   Oropharynx: clear   Resp: Clear to auscultation  CVS: Regular rate and rhythm  Abdomen: soft, NT, ND  Ext: no edema, NT of lower extremities       Assessment/Plan   Problem List Items Addressed This Visit       Benign hypertension  Continue current regimen, follow up in 2 weeks for BP check    Dyslipidemia    Relevant Orders    Lipid Panel    TSH with reflex to Free T4 if abnormal    Comprehensive Metabolic Panel    CBC     Other Visit Diagnoses       Acute bronchitis, unspecified organism    -  Primary    Relevant Medications    amoxicillin-pot clavulanate (Augmentin) 875-125 mg tablet    benzonatate (Tessalon) 100 mg capsule               "

## 2024-07-17 ENCOUNTER — APPOINTMENT (OUTPATIENT)
Dept: CARDIOLOGY | Facility: HOSPITAL | Age: 84
End: 2024-07-17
Payer: MEDICARE

## 2024-07-17 ENCOUNTER — HOSPITAL ENCOUNTER (INPATIENT)
Facility: HOSPITAL | Age: 84
LOS: 2 days | Discharge: HOME | End: 2024-07-19
Attending: EMERGENCY MEDICINE | Admitting: INTERNAL MEDICINE
Payer: MEDICARE

## 2024-07-17 ENCOUNTER — APPOINTMENT (OUTPATIENT)
Dept: RADIOLOGY | Facility: HOSPITAL | Age: 84
End: 2024-07-17
Payer: MEDICARE

## 2024-07-17 DIAGNOSIS — J96.01 SEPSIS WITH ACUTE HYPOXIC RESPIRATORY FAILURE WITHOUT SEPTIC SHOCK, DUE TO UNSPECIFIED ORGANISM (MULTI): Primary | ICD-10-CM

## 2024-07-17 DIAGNOSIS — R65.20 SEPSIS WITH ACUTE HYPOXIC RESPIRATORY FAILURE WITHOUT SEPTIC SHOCK, DUE TO UNSPECIFIED ORGANISM (MULTI): Primary | ICD-10-CM

## 2024-07-17 DIAGNOSIS — J18.9 PNEUMONIA OF RIGHT LOWER LOBE DUE TO INFECTIOUS ORGANISM: ICD-10-CM

## 2024-07-17 DIAGNOSIS — R53.1 WEAKNESS: ICD-10-CM

## 2024-07-17 DIAGNOSIS — E87.1 HYPONATREMIA: ICD-10-CM

## 2024-07-17 DIAGNOSIS — A41.9 SEPSIS WITH ACUTE HYPOXIC RESPIRATORY FAILURE WITHOUT SEPTIC SHOCK, DUE TO UNSPECIFIED ORGANISM (MULTI): Primary | ICD-10-CM

## 2024-07-17 PROBLEM — N18.31 STAGE 3A CHRONIC KIDNEY DISEASE (MULTI): Status: ACTIVE | Noted: 2024-07-17

## 2024-07-17 PROBLEM — N17.9 AKI (ACUTE KIDNEY INJURY) (CMS-HCC): Status: ACTIVE | Noted: 2024-07-17

## 2024-07-17 LAB
ALBUMIN SERPL BCP-MCNC: 3.8 G/DL (ref 3.4–5)
ALP SERPL-CCNC: 42 U/L (ref 33–136)
ALT SERPL W P-5'-P-CCNC: 25 U/L (ref 10–52)
ANION GAP SERPL CALC-SCNC: 15 MMOL/L (ref 10–20)
APPEARANCE UR: CLEAR
AST SERPL W P-5'-P-CCNC: 36 U/L (ref 9–39)
BASOPHILS # BLD AUTO: 0.01 X10*3/UL (ref 0–0.1)
BASOPHILS NFR BLD AUTO: 0.1 %
BILIRUB SERPL-MCNC: 1.1 MG/DL (ref 0–1.2)
BILIRUB UR STRIP.AUTO-MCNC: NEGATIVE MG/DL
BUN SERPL-MCNC: 27 MG/DL (ref 6–23)
CALCIUM SERPL-MCNC: 9.2 MG/DL (ref 8.6–10.3)
CARDIAC TROPONIN I PNL SERPL HS: 13 NG/L (ref 0–20)
CHLORIDE SERPL-SCNC: 96 MMOL/L (ref 98–107)
CO2 SERPL-SCNC: 21 MMOL/L (ref 21–32)
COLOR UR: YELLOW
CREAT SERPL-MCNC: 1.49 MG/DL (ref 0.5–1.3)
EGFRCR SERPLBLD CKD-EPI 2021: 46 ML/MIN/1.73M*2
EOSINOPHIL # BLD AUTO: 0 X10*3/UL (ref 0–0.4)
EOSINOPHIL NFR BLD AUTO: 0 %
ERYTHROCYTE [DISTWIDTH] IN BLOOD BY AUTOMATED COUNT: 14.6 % (ref 11.5–14.5)
GLUCOSE SERPL-MCNC: 106 MG/DL (ref 74–99)
GLUCOSE UR STRIP.AUTO-MCNC: NORMAL MG/DL
HCT VFR BLD AUTO: 46.9 % (ref 41–52)
HGB BLD-MCNC: 16.1 G/DL (ref 13.5–17.5)
IMM GRANULOCYTES # BLD AUTO: 0.11 X10*3/UL (ref 0–0.5)
IMM GRANULOCYTES NFR BLD AUTO: 0.7 % (ref 0–0.9)
KETONES UR STRIP.AUTO-MCNC: ABNORMAL MG/DL
LACTATE SERPL-SCNC: 1 MMOL/L (ref 0.4–2)
LEUKOCYTE ESTERASE UR QL STRIP.AUTO: NEGATIVE
LYMPHOCYTES # BLD AUTO: 1.44 X10*3/UL (ref 0.8–3)
LYMPHOCYTES NFR BLD AUTO: 8.8 %
MCH RBC QN AUTO: 30.8 PG (ref 26–34)
MCHC RBC AUTO-ENTMCNC: 34.3 G/DL (ref 32–36)
MCV RBC AUTO: 90 FL (ref 80–100)
MONOCYTES # BLD AUTO: 2.12 X10*3/UL (ref 0.05–0.8)
MONOCYTES NFR BLD AUTO: 13 %
MRSA DNA SPEC QL NAA+PROBE: NOT DETECTED
MUCOUS THREADS #/AREA URNS AUTO: NORMAL /LPF
NEUTROPHILS # BLD AUTO: 12.64 X10*3/UL (ref 1.6–5.5)
NEUTROPHILS NFR BLD AUTO: 77.4 %
NITRITE UR QL STRIP.AUTO: NEGATIVE
NRBC BLD-RTO: 0 /100 WBCS (ref 0–0)
PH UR STRIP.AUTO: 6 [PH]
PLATELET # BLD AUTO: 203 X10*3/UL (ref 150–450)
POTASSIUM SERPL-SCNC: 5.1 MMOL/L (ref 3.5–5.3)
PROT SERPL-MCNC: 7.6 G/DL (ref 6.4–8.2)
PROT UR STRIP.AUTO-MCNC: ABNORMAL MG/DL
RBC # BLD AUTO: 5.22 X10*6/UL (ref 4.5–5.9)
RBC # UR STRIP.AUTO: ABNORMAL /UL
RBC #/AREA URNS AUTO: NORMAL /HPF
SARS-COV-2 RNA RESP QL NAA+PROBE: NOT DETECTED
SODIUM SERPL-SCNC: 127 MMOL/L (ref 136–145)
SP GR UR STRIP.AUTO: 1.02
UROBILINOGEN UR STRIP.AUTO-MCNC: NORMAL MG/DL
WBC # BLD AUTO: 16.3 X10*3/UL (ref 4.4–11.3)
WBC #/AREA URNS AUTO: NORMAL /HPF

## 2024-07-17 PROCEDURE — 96361 HYDRATE IV INFUSION ADD-ON: CPT

## 2024-07-17 PROCEDURE — 99223 1ST HOSP IP/OBS HIGH 75: CPT

## 2024-07-17 PROCEDURE — 84484 ASSAY OF TROPONIN QUANT: CPT | Performed by: EMERGENCY MEDICINE

## 2024-07-17 PROCEDURE — 87641 MR-STAPH DNA AMP PROBE: CPT | Performed by: EMERGENCY MEDICINE

## 2024-07-17 PROCEDURE — 87040 BLOOD CULTURE FOR BACTERIA: CPT | Mod: 59,GENLAB | Performed by: EMERGENCY MEDICINE

## 2024-07-17 PROCEDURE — 2500000005 HC RX 250 GENERAL PHARMACY W/O HCPCS: Mod: IPSPLIT | Performed by: NURSE PRACTITIONER

## 2024-07-17 PROCEDURE — 83605 ASSAY OF LACTIC ACID: CPT | Performed by: EMERGENCY MEDICINE

## 2024-07-17 PROCEDURE — 80053 COMPREHEN METABOLIC PANEL: CPT | Performed by: EMERGENCY MEDICINE

## 2024-07-17 PROCEDURE — 2500000001 HC RX 250 WO HCPCS SELF ADMINISTERED DRUGS (ALT 637 FOR MEDICARE OP): Mod: IPSPLIT | Performed by: NURSE PRACTITIONER

## 2024-07-17 PROCEDURE — 87635 SARS-COV-2 COVID-19 AMP PRB: CPT | Performed by: EMERGENCY MEDICINE

## 2024-07-17 PROCEDURE — 96365 THER/PROPH/DIAG IV INF INIT: CPT | Mod: 59 | Performed by: EMERGENCY MEDICINE

## 2024-07-17 PROCEDURE — 94760 N-INVAS EAR/PLS OXIMETRY 1: CPT | Mod: IPSPLIT

## 2024-07-17 PROCEDURE — 2500000004 HC RX 250 GENERAL PHARMACY W/ HCPCS (ALT 636 FOR OP/ED): Mod: IPSPLIT

## 2024-07-17 PROCEDURE — 96367 TX/PROPH/DG ADDL SEQ IV INF: CPT

## 2024-07-17 PROCEDURE — 1200000002 HC GENERAL ROOM WITH TELEMETRY DAILY: Mod: IPSPLIT

## 2024-07-17 PROCEDURE — 2500000005 HC RX 250 GENERAL PHARMACY W/O HCPCS: Performed by: EMERGENCY MEDICINE

## 2024-07-17 PROCEDURE — 93005 ELECTROCARDIOGRAM TRACING: CPT

## 2024-07-17 PROCEDURE — 99285 EMERGENCY DEPT VISIT HI MDM: CPT | Mod: 25

## 2024-07-17 PROCEDURE — 2500000004 HC RX 250 GENERAL PHARMACY W/ HCPCS (ALT 636 FOR OP/ED): Mod: IPSPLIT | Performed by: NURSE PRACTITIONER

## 2024-07-17 PROCEDURE — 96376 TX/PRO/DX INJ SAME DRUG ADON: CPT

## 2024-07-17 PROCEDURE — 81001 URINALYSIS AUTO W/SCOPE: CPT | Performed by: EMERGENCY MEDICINE

## 2024-07-17 PROCEDURE — 96365 THER/PROPH/DIAG IV INF INIT: CPT

## 2024-07-17 PROCEDURE — 2500000004 HC RX 250 GENERAL PHARMACY W/ HCPCS (ALT 636 FOR OP/ED)

## 2024-07-17 PROCEDURE — 71045 X-RAY EXAM CHEST 1 VIEW: CPT

## 2024-07-17 PROCEDURE — 2500000004 HC RX 250 GENERAL PHARMACY W/ HCPCS (ALT 636 FOR OP/ED): Performed by: EMERGENCY MEDICINE

## 2024-07-17 PROCEDURE — 2500000001 HC RX 250 WO HCPCS SELF ADMINISTERED DRUGS (ALT 637 FOR MEDICARE OP): Mod: IPSPLIT

## 2024-07-17 PROCEDURE — 36415 COLL VENOUS BLD VENIPUNCTURE: CPT | Performed by: EMERGENCY MEDICINE

## 2024-07-17 PROCEDURE — 85025 COMPLETE CBC W/AUTO DIFF WBC: CPT | Performed by: EMERGENCY MEDICINE

## 2024-07-17 PROCEDURE — 2500000002 HC RX 250 W HCPCS SELF ADMINISTERED DRUGS (ALT 637 FOR MEDICARE OP, ALT 636 FOR OP/ED): Mod: IPSPLIT

## 2024-07-17 RX ORDER — GUAIFENESIN 600 MG/1
600 TABLET, EXTENDED RELEASE ORAL 2 TIMES DAILY
Status: DISCONTINUED | OUTPATIENT
Start: 2024-07-17 | End: 2024-07-19 | Stop reason: HOSPADM

## 2024-07-17 RX ORDER — ENOXAPARIN SODIUM 100 MG/ML
40 INJECTION SUBCUTANEOUS EVERY 24 HOURS
Status: DISCONTINUED | OUTPATIENT
Start: 2024-07-17 | End: 2024-07-19 | Stop reason: HOSPADM

## 2024-07-17 RX ORDER — ONDANSETRON HYDROCHLORIDE 2 MG/ML
4 INJECTION, SOLUTION INTRAVENOUS EVERY 8 HOURS PRN
Status: DISCONTINUED | OUTPATIENT
Start: 2024-07-17 | End: 2024-07-19 | Stop reason: HOSPADM

## 2024-07-17 RX ORDER — ONDANSETRON 4 MG/1
4 TABLET, FILM COATED ORAL EVERY 8 HOURS PRN
Status: DISCONTINUED | OUTPATIENT
Start: 2024-07-17 | End: 2024-07-19 | Stop reason: HOSPADM

## 2024-07-17 RX ORDER — ACETAMINOPHEN 325 MG/1
650 TABLET ORAL ONCE
Status: COMPLETED | OUTPATIENT
Start: 2024-07-17 | End: 2024-07-17

## 2024-07-17 RX ORDER — ACETAMINOPHEN 325 MG/1
650 TABLET ORAL EVERY 4 HOURS PRN
Status: DISCONTINUED | OUTPATIENT
Start: 2024-07-17 | End: 2024-07-17

## 2024-07-17 RX ORDER — PANTOPRAZOLE SODIUM 40 MG/10ML
40 INJECTION, POWDER, LYOPHILIZED, FOR SOLUTION INTRAVENOUS
Status: DISCONTINUED | OUTPATIENT
Start: 2024-07-18 | End: 2024-07-19 | Stop reason: HOSPADM

## 2024-07-17 RX ORDER — BENZONATATE 100 MG/1
100 CAPSULE ORAL 3 TIMES DAILY PRN
Status: DISCONTINUED | OUTPATIENT
Start: 2024-07-17 | End: 2024-07-19 | Stop reason: HOSPADM

## 2024-07-17 RX ORDER — PANTOPRAZOLE SODIUM 20 MG/1
20 TABLET, DELAYED RELEASE ORAL
Status: DISCONTINUED | OUTPATIENT
Start: 2024-07-18 | End: 2024-07-17

## 2024-07-17 RX ORDER — VITAMIN E MIXED 400 UNIT
400 CAPSULE ORAL DAILY
COMMUNITY

## 2024-07-17 RX ORDER — AZITHROMYCIN 100 MG/ML
INJECTION, POWDER, LYOPHILIZED, FOR SOLUTION INTRAVENOUS
Status: COMPLETED
Start: 2024-07-17 | End: 2024-07-17

## 2024-07-17 RX ORDER — DIFLUPREDNATE OPHTHALMIC 0.5 MG/ML
1 EMULSION OPHTHALMIC EVERY 12 HOURS
COMMUNITY
Start: 2024-07-09

## 2024-07-17 RX ORDER — ASPIRIN 81 MG/1
81 TABLET ORAL DAILY
Status: DISCONTINUED | OUTPATIENT
Start: 2024-07-17 | End: 2024-07-19 | Stop reason: HOSPADM

## 2024-07-17 RX ORDER — TACROLIMUS 1 MG/G
1 OINTMENT TOPICAL 2 TIMES DAILY
Status: DISCONTINUED | OUTPATIENT
Start: 2024-07-17 | End: 2024-07-17

## 2024-07-17 RX ORDER — PANTOPRAZOLE SODIUM 40 MG/1
40 TABLET, DELAYED RELEASE ORAL
Status: DISCONTINUED | OUTPATIENT
Start: 2024-07-18 | End: 2024-07-17

## 2024-07-17 RX ORDER — AMOXICILLIN 250 MG
1 CAPSULE ORAL 2 TIMES DAILY
Status: DISCONTINUED | OUTPATIENT
Start: 2024-07-17 | End: 2024-07-19 | Stop reason: HOSPADM

## 2024-07-17 RX ORDER — PREDNISOLONE ACETATE 10 MG/ML
1 SUSPENSION/ DROPS OPHTHALMIC 2 TIMES DAILY
Status: DISCONTINUED | OUTPATIENT
Start: 2024-07-17 | End: 2024-07-17

## 2024-07-17 RX ORDER — CARVEDILOL 12.5 MG/1
12.5 TABLET ORAL
Status: DISCONTINUED | OUTPATIENT
Start: 2024-07-18 | End: 2024-07-19 | Stop reason: HOSPADM

## 2024-07-17 RX ORDER — DORZOLAMIDE HCL 20 MG/ML
1 SOLUTION/ DROPS OPHTHALMIC 2 TIMES DAILY
Status: DISCONTINUED | OUTPATIENT
Start: 2024-07-17 | End: 2024-07-19 | Stop reason: HOSPADM

## 2024-07-17 RX ORDER — ACETAMINOPHEN 500 MG
5 TABLET ORAL NIGHTLY PRN
Status: DISCONTINUED | OUTPATIENT
Start: 2024-07-17 | End: 2024-07-19 | Stop reason: HOSPADM

## 2024-07-17 RX ORDER — VANCOMYCIN HYDROCHLORIDE 1 G/20ML
INJECTION, POWDER, LYOPHILIZED, FOR SOLUTION INTRAVENOUS DAILY PRN
Status: DISCONTINUED | OUTPATIENT
Start: 2024-07-17 | End: 2024-07-18

## 2024-07-17 RX ORDER — DIGOXIN 125 MCG
125 TABLET ORAL DAILY
Status: DISCONTINUED | OUTPATIENT
Start: 2024-07-17 | End: 2024-07-19 | Stop reason: HOSPADM

## 2024-07-17 RX ORDER — CALCIUM CARBONATE 200(500)MG
500 TABLET,CHEWABLE ORAL 4 TIMES DAILY PRN
Status: DISCONTINUED | OUTPATIENT
Start: 2024-07-17 | End: 2024-07-19 | Stop reason: HOSPADM

## 2024-07-17 RX ORDER — VANCOMYCIN HYDROCHLORIDE 1 G/200ML
1 INJECTION, SOLUTION INTRAVENOUS
Status: COMPLETED | OUTPATIENT
Start: 2024-07-17 | End: 2024-07-17

## 2024-07-17 RX ORDER — SPIRONOLACTONE 25 MG/1
50 TABLET ORAL DAILY
Status: DISCONTINUED | OUTPATIENT
Start: 2024-07-17 | End: 2024-07-19 | Stop reason: HOSPADM

## 2024-07-17 RX ORDER — VITAMIN E MIXED 400 UNIT
400 CAPSULE ORAL DAILY
Status: DISCONTINUED | OUTPATIENT
Start: 2024-07-17 | End: 2024-07-19 | Stop reason: HOSPADM

## 2024-07-17 RX ORDER — DIFLUPREDNATE OPHTHALMIC 0.5 MG/ML
1 EMULSION OPHTHALMIC EVERY 12 HOURS
Status: DISCONTINUED | OUTPATIENT
Start: 2024-07-17 | End: 2024-07-19 | Stop reason: HOSPADM

## 2024-07-17 RX ORDER — ACETAMINOPHEN 325 MG/1
650 TABLET ORAL EVERY 4 HOURS PRN
Status: DISCONTINUED | OUTPATIENT
Start: 2024-07-17 | End: 2024-07-19 | Stop reason: HOSPADM

## 2024-07-17 SDOH — SOCIAL STABILITY: SOCIAL INSECURITY: ABUSE: ADULT

## 2024-07-17 SDOH — SOCIAL STABILITY: SOCIAL INSECURITY: DO YOU FEEL UNSAFE GOING BACK TO THE PLACE WHERE YOU ARE LIVING?: NO

## 2024-07-17 SDOH — SOCIAL STABILITY: SOCIAL INSECURITY: ARE YOU OR HAVE YOU BEEN THREATENED OR ABUSED PHYSICALLY, EMOTIONALLY, OR SEXUALLY BY ANYONE?: NO

## 2024-07-17 SDOH — SOCIAL STABILITY: SOCIAL INSECURITY: HAVE YOU HAD THOUGHTS OF HARMING ANYONE ELSE?: NO

## 2024-07-17 SDOH — SOCIAL STABILITY: SOCIAL INSECURITY: ARE THERE ANY APPARENT SIGNS OF INJURIES/BEHAVIORS THAT COULD BE RELATED TO ABUSE/NEGLECT?: NO

## 2024-07-17 SDOH — SOCIAL STABILITY: SOCIAL INSECURITY: DO YOU FEEL ANYONE HAS EXPLOITED OR TAKEN ADVANTAGE OF YOU FINANCIALLY OR OF YOUR PERSONAL PROPERTY?: NO

## 2024-07-17 SDOH — SOCIAL STABILITY: SOCIAL INSECURITY: HAS ANYONE EVER THREATENED TO HURT YOUR FAMILY OR YOUR PETS?: NO

## 2024-07-17 SDOH — SOCIAL STABILITY: SOCIAL INSECURITY: DOES ANYONE TRY TO KEEP YOU FROM HAVING/CONTACTING OTHER FRIENDS OR DOING THINGS OUTSIDE YOUR HOME?: NO

## 2024-07-17 SDOH — SOCIAL STABILITY: SOCIAL INSECURITY: HAVE YOU HAD ANY THOUGHTS OF HARMING ANYONE ELSE?: NO

## 2024-07-17 SDOH — SOCIAL STABILITY: SOCIAL INSECURITY: WERE YOU ABLE TO COMPLETE ALL THE BEHAVIORAL HEALTH SCREENINGS?: YES

## 2024-07-17 ASSESSMENT — COGNITIVE AND FUNCTIONAL STATUS - GENERAL
CLIMB 3 TO 5 STEPS WITH RAILING: A LITTLE
MOBILITY SCORE: 22
PATIENT BASELINE BEDBOUND: NO
DAILY ACTIVITIY SCORE: 24
WALKING IN HOSPITAL ROOM: A LITTLE

## 2024-07-17 ASSESSMENT — ENCOUNTER SYMPTOMS
FATIGUE: 1
EYES NEGATIVE: 1
SHORTNESS OF BREATH: 1
HEMATOLOGIC/LYMPHATIC NEGATIVE: 1
WEAKNESS: 1
ENDOCRINE NEGATIVE: 1
COUGH: 1
ALLERGIC/IMMUNOLOGIC NEGATIVE: 1
FEVER: 1
PSYCHIATRIC NEGATIVE: 1
ACTIVITY CHANGE: 1
GASTROINTESTINAL NEGATIVE: 1
CARDIOVASCULAR NEGATIVE: 1
MUSCULOSKELETAL NEGATIVE: 1

## 2024-07-17 ASSESSMENT — ACTIVITIES OF DAILY LIVING (ADL)
JUDGMENT_ADEQUATE_SAFELY_COMPLETE_DAILY_ACTIVITIES: YES
GROOMING: INDEPENDENT
HEARING - LEFT EAR: FUNCTIONAL
ADEQUATE_TO_COMPLETE_ADL: YES
WALKS IN HOME: INDEPENDENT
WALKS IN HOME: INDEPENDENT
PATIENT'S MEMORY ADEQUATE TO SAFELY COMPLETE DAILY ACTIVITIES?: YES
LACK_OF_TRANSPORTATION: NO
TOILETING: INDEPENDENT
BATHING: INDEPENDENT
HEARING - RIGHT EAR: FUNCTIONAL
FEEDING YOURSELF: INDEPENDENT
DRESSING YOURSELF: INDEPENDENT

## 2024-07-17 ASSESSMENT — PAIN SCALES - GENERAL
PAINLEVEL_OUTOF10: 0 - NO PAIN

## 2024-07-17 ASSESSMENT — COLUMBIA-SUICIDE SEVERITY RATING SCALE - C-SSRS
6. HAVE YOU EVER DONE ANYTHING, STARTED TO DO ANYTHING, OR PREPARED TO DO ANYTHING TO END YOUR LIFE?: NO
2. HAVE YOU ACTUALLY HAD ANY THOUGHTS OF KILLING YOURSELF?: NO
1. IN THE PAST MONTH, HAVE YOU WISHED YOU WERE DEAD OR WISHED YOU COULD GO TO SLEEP AND NOT WAKE UP?: NO

## 2024-07-17 ASSESSMENT — PAIN - FUNCTIONAL ASSESSMENT
PAIN_FUNCTIONAL_ASSESSMENT: 0-10

## 2024-07-17 ASSESSMENT — LIFESTYLE VARIABLES
AUDIT-C TOTAL SCORE: 0
HOW OFTEN DO YOU HAVE A DRINK CONTAINING ALCOHOL: NEVER
HOW OFTEN DO YOU HAVE 6 OR MORE DRINKS ON ONE OCCASION: NEVER
HOW MANY STANDARD DRINKS CONTAINING ALCOHOL DO YOU HAVE ON A TYPICAL DAY: PATIENT DOES NOT DRINK
SKIP TO QUESTIONS 9-10: 1
AUDIT-C TOTAL SCORE: 0

## 2024-07-17 ASSESSMENT — PATIENT HEALTH QUESTIONNAIRE - PHQ9
1. LITTLE INTEREST OR PLEASURE IN DOING THINGS: NOT AT ALL
2. FEELING DOWN, DEPRESSED OR HOPELESS: NOT AT ALL
SUM OF ALL RESPONSES TO PHQ9 QUESTIONS 1 & 2: 0

## 2024-07-17 NOTE — H&P
History Of Present Illness  Terrance Armijo is a 84 y.o. male presenting with fever, cough, shortness of breath, and generalized weakness.  Patient states that he has been having symptoms for about the past 5 days.  He did see his PCP 5 days ago with the onset of his symptoms and was started on antibiotics.  However, patient did not like the way his antibiotics were making him feel so he discontinued them on his own 3 days ago.  Since then, he has continued to have cough, generalized weakness, and decreased energy, so he was brought to the ED for further management.  He otherwise denies any chest pain , abdominal pain, vomiting, or changes in bowel or bladder habits.  He is not on any blood thinners.  He denies any other sick contacts. Patient currently resting in bed,, reports fatigue and weakness.  Discussed treatment plan, patient agrees, and states understanding.       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 He has never used smokeless tobacco. He reports that he does not drink alcohol and does not use  drugs.    Family History  Family History   Problem Relation Name Age of Onset    Other (cardiac disorder) Mother      Other (cardiac disorder) Other Maternal Uncle         Allergies  Amlodipine, Lisinopril, and Atorvastatin    Review of Systems   Constitutional:  Positive for activity change, fatigue and fever.   HENT:  Positive for congestion.    Eyes: Negative.    Respiratory:  Positive for cough and shortness of breath.    Cardiovascular: Negative.    Gastrointestinal: Negative.    Endocrine: Negative.    Genitourinary: Negative.    Musculoskeletal: Negative.    Skin: Negative.    Allergic/Immunologic: Negative.    Neurological:  Positive for weakness.   Hematological: Negative.    Psychiatric/Behavioral: Negative.          Physical Exam  Constitutional:       Appearance: Normal appearance.   HENT:      Head: Normocephalic and atraumatic.      Nose: Nose normal.      Mouth/Throat:      Mouth: Mucous membranes are moist.   Eyes:      Extraocular Movements: Extraocular movements intact.      Pupils: Pupils are equal, round, and reactive to light.   Cardiovascular:      Rate and Rhythm: Normal rate and regular rhythm.      Pulses: Normal pulses.      Heart sounds: Normal heart sounds.   Pulmonary:      Effort: Pulmonary effort is normal.      Comments: Diminished Bases  Abdominal:      General: Bowel sounds are normal.      Palpations: Abdomen is soft.   Musculoskeletal:         General: Normal range of motion.      Cervical back: Normal range of motion.   Skin:     General: Skin is warm and dry.      Capillary Refill: Capillary refill takes less than 2 seconds.   Neurological:      Mental Status: He is alert. Mental status is at baseline.      Motor: Weakness present.   Psychiatric:         Mood and Affect: Mood normal.         Behavior: Behavior normal.        Last Recorded Vitals  Blood pressure (!) 129/96, pulse 84, temperature 35.9 °C (96.6 °F), temperature source Temporal, resp. rate 20, height 1.651 m (5'  "5\"), weight 67.2 kg (148 lb 2.4 oz), SpO2 98%.    Relevant Results  Scheduled medications  aspirin, 81 mg, oral, Daily  [START ON 7/18/2024] carvedilol, 12.5 mg, oral, BID  difluprednate, 1 drop, Left Eye, q12h  digoxin, 125 mcg, oral, Daily  enoxaparin, 40 mg, subcutaneous, q24h  guaiFENesin, 600 mg, oral, BID  oxygen, , inhalation, Continuous - Inhalation  [START ON 7/18/2024] pantoprazole, 40 mg, intravenous, Daily before breakfast  piperacillin-tazobactam, 3.375 g, intravenous, q6h  prednisoLONE acetate, 1 drop, Left Eye, BID  ruxolitinib, 1 Application, Topical, BID  sennosides-docusate sodium, 1 tablet, oral, BID  spironolactone, 50 mg, oral, Daily  tacrolimus, 1 Application, Topical, BID  vitamin E, 400 Units, oral, Daily      Continuous medications     PRN medications  PRN medications: acetaminophen, benzonatate, calcium carbonate, melatonin, ondansetron **OR** ondansetron, vancomycin    XR chest 1 view    Result Date: 7/17/2024  STUDY: Chest Radiograph;  7/17/2024 at 13:01 INDICATION: Fever and cough. COMPARISON: None Available ACCESSION NUMBER(S): [id number] ORDERING CLINICIAN: LORENE ALEXIS TECHNIQUE:  Frontal chest was obtained at 13:01 hours. FINDINGS: There is a bipolar left-sided pacemaker/AICD with leads in right atrium and right ventricle apex. CARDIOMEDIASTINAL SILHOUETTE: Cardiomediastinal silhouette is normal in size and configuration.  LUNGS: There is an ill-defined opacity within the right lower lobe with underlying stranding probably representing an evolving pneumonia.   ABDOMEN: No remarkable upper abdominal findings.  BONES: No acute osseous changes.    Ill-defined opacity within the right lower lobe with underlying stranding probably representing an evolving pneumonia.  Short-term interval follow-up suggested. Signed by Leeroy Cuevas MD    ECG 12 lead    Result Date: 7/17/2024  Sinus rhythm with 1st degree AV block Possible Left atrial enlargement Left axis deviation Left bundle branch block " Abnormal ECG When compared with ECG of 31-MAR-2022 07:21, Vent. rate has increased BY  39 BPM ST elevation has replaced ST depression in Anterior leads     Results for orders placed or performed during the hospital encounter of 07/17/24 (from the past 24 hour(s))   ECG 12 lead   Result Value Ref Range    Ventricular Rate 98 BPM    Atrial Rate 98 BPM    CO Interval 236 ms    QRS Duration 144 ms    QT Interval 362 ms    QTC Calculation(Bazett) 462 ms    P Axis 30 degrees    R Axis -58 degrees    T Axis 95 degrees    QRS Count 17 beats    Q Onset 210 ms    P Onset 92 ms    P Offset 145 ms    T Offset 391 ms    QTC Fredericia 426 ms   Blood Culture    Specimen: Peripheral Venipuncture; Blood culture   Result Value Ref Range    Blood Culture Loaded on Instrument - Culture in progress    Sars-CoV-2 PCR   Result Value Ref Range    Coronavirus 2019, PCR Not Detected Not Detected   Comprehensive Metabolic Panel   Result Value Ref Range    Glucose 106 (H) 74 - 99 mg/dL    Sodium 127 (L) 136 - 145 mmol/L    Potassium 5.1 3.5 - 5.3 mmol/L    Chloride 96 (L) 98 - 107 mmol/L    Bicarbonate 21 21 - 32 mmol/L    Anion Gap 15 10 - 20 mmol/L    Urea Nitrogen 27 (H) 6 - 23 mg/dL    Creatinine 1.49 (H) 0.50 - 1.30 mg/dL    eGFR 46 (L) >60 mL/min/1.73m*2    Calcium 9.2 8.6 - 10.3 mg/dL    Albumin 3.8 3.4 - 5.0 g/dL    Alkaline Phosphatase 42 33 - 136 U/L    Total Protein 7.6 6.4 - 8.2 g/dL    AST 36 9 - 39 U/L    Bilirubin, Total 1.1 0.0 - 1.2 mg/dL    ALT 25 10 - 52 U/L   Lactate   Result Value Ref Range    Lactate 1.0 0.4 - 2.0 mmol/L   Troponin I, High Sensitivity   Result Value Ref Range    Troponin I, High Sensitivity 13 0 - 20 ng/L   Blood Culture    Specimen: Peripheral Venipuncture; Blood culture   Result Value Ref Range    Blood Culture Loaded on Instrument - Culture in progress    CBC with Differential   Result Value Ref Range    WBC 16.3 (H) 4.4 - 11.3 x10*3/uL    nRBC 0.0 0.0 - 0.0 /100 WBCs    RBC 5.22 4.50 - 5.90 x10*6/uL     Hemoglobin 16.1 13.5 - 17.5 g/dL    Hematocrit 46.9 41.0 - 52.0 %    MCV 90 80 - 100 fL    MCH 30.8 26.0 - 34.0 pg    MCHC 34.3 32.0 - 36.0 g/dL    RDW 14.6 (H) 11.5 - 14.5 %    Platelets 203 150 - 450 x10*3/uL    Neutrophils % 77.4 40.0 - 80.0 %    Immature Granulocytes %, Automated 0.7 0.0 - 0.9 %    Lymphocytes % 8.8 13.0 - 44.0 %    Monocytes % 13.0 2.0 - 10.0 %    Eosinophils % 0.0 0.0 - 6.0 %    Basophils % 0.1 0.0 - 2.0 %    Neutrophils Absolute 12.64 (H) 1.60 - 5.50 x10*3/uL    Immature Granulocytes Absolute, Automated 0.11 0.00 - 0.50 x10*3/uL    Lymphocytes Absolute 1.44 0.80 - 3.00 x10*3/uL    Monocytes Absolute 2.12 (H) 0.05 - 0.80 x10*3/uL    Eosinophils Absolute 0.00 0.00 - 0.40 x10*3/uL    Basophils Absolute 0.01 0.00 - 0.10 x10*3/uL   MRSA Surveillance for Vancomycin De-escalation, PCR    Specimen: Anterior Nares; Swab   Result Value Ref Range    MRSA PCR Not Detected Not Detected   Urinalysis with Reflex Microscopic   Result Value Ref Range    Color, Urine Yellow Light-Yellow, Yellow, Dark-Yellow    Appearance, Urine Clear Clear    Specific Gravity, Urine 1.017 1.005 - 1.035    pH, Urine 6.0 5.0, 5.5, 6.0, 6.5, 7.0, 7.5, 8.0    Protein, Urine 50 (1+) (A) NEGATIVE, 10 (TRACE), 20 (TRACE) mg/dL    Glucose, Urine Normal Normal mg/dL    Blood, Urine 0.03 (TRACE) (A) NEGATIVE    Ketones, Urine 10 (1+) (A) NEGATIVE mg/dL    Bilirubin, Urine NEGATIVE NEGATIVE    Urobilinogen, Urine Normal Normal mg/dL    Nitrite, Urine NEGATIVE NEGATIVE    Leukocyte Esterase, Urine NEGATIVE NEGATIVE   Microscopic Only, Urine   Result Value Ref Range    WBC, Urine NONE 1-5, NONE /HPF    RBC, Urine 1-2 NONE, 1-2, 3-5 /HPF    Mucus, Urine FEW Reference range not established. /LPF        Assessment/Plan   Principal Problem:    Sepsis with acute hypoxic respiratory failure without septic shock, due to unspecified organism (Multi)  Active Problems:    Benign hypertension    Cardiac defibrillator in place    Congestive heart  failure (Multi)    Gastroesophageal reflux disease    Nonischemic cardiomyopathy (Multi)    Psoriasis    Retinal detachment    Acute iritis    Pneumonia of right lower lobe due to infectious organism    Hyponatremia    AGNIESZKA (acute kidney injury) (CMS-HCC)    Stage 3a chronic kidney disease (Multi)    Weakness      Principal Problem:    #Sepsis with acute hypoxic respiratory failure without septic shock, due to unspecified organism (Multi)    #Pneumonia of right lower lobe due to infectious organism  -WBC 16.3  -BC x 2 Ordered  -Covid Negative  -Lactate 1.0  -Procalcitonin Ordered  -Respiratory Culture Ordered  -CXR  IMPRESSION:  Ill-defined opacity within the right lower lobe with underlying  stranding probably representing an evolving pneumonia.  Short-term  interval follow-up suggested.  -Mucinex 600 mg PO BID   -Zithromax 500 mg IV x1 given in ED   -Zosyn 3.375 g IV Q 6 hours (Day 1)  -[facility] to Dose (Day 1)  -Home O2 RA  -Currently on 3L NS continuously     Active Problems:    #Benign hypertension    #Cardiac defibrillator in place    #Congestive heart failure (Multi)    #Nonischemic cardiomyopathy (Multi)  -Troponin 13  -BNP Ordered  -continue ASA 81  mg PO Daily   -continue Digoxin 125 mcg PO Daily   -continue Aldactone 50 mg PO Daily   -continue Coreg 12.5 PO BID       #Hyponatremia  -Sodium 127  -NS 3,000 ml IV given in ED       #AGNIESZKA    #CKD 3A  -BUN/Creat/GFR  27/1.49/46  -NS 3,000 ml IV given in ED       #Weakness  -PT/OT eval and Treat      #Gastroesophageal reflux disease  -Protonix 40 mg PO/IV Daily       #Retinal detachment    #Acute iritis  -continue Durezol 0.05% 1 drop BID, Left Eye   -continue Trusopt 2% 1 drop BID, Left Eye   -continue Pred-Forte 1% drop BID, Left Eye       #Psoriasis  -Opzelura cream BID  -continue Protopic 0.1% oint BID      DVT Prophylaxis: Lovenox   Fluids: N/A   Nutrition: Cardiac    Code Status: Full Code    Pt requires inpatient stay at this time.  Total accumulated  time spent face to face and not face to face preparing to see the patient, obtaining and reviewing separately obtained history; performing a medically appropriate examination and/or evaluation; counseling and educating the patient, family; ordering medications, tests, or procedures; referring and communicating with other health care professionals; documenting clinical information in the patient's medical record; independently interpreting results and communicating the results to the patient, family; and care coordination was 45 minutes.      LONNIE Vasquez-CNP      Attending Attestation:    I was present with the LONNIE-CNP who participated in the documentation of this note. I have personally seen and re-examined the patient and performed the medical decision-making components (assessment and plan of care). I have reviewed the documentation and verified the findings in the note as written with additions or exceptions as stated in the body of this note.    Dr. Jose Daniel Gannon MD  Internal Medicine

## 2024-07-17 NOTE — NURSING NOTE
Patient states he has a Living Will and POA. Paperwork requested from family. Patient unsure of his eye drops and family said they would bring them in this evening. Patient states he was also due for his Repatha injection today, so family said they would bring that in tonight, also.

## 2024-07-17 NOTE — NURSING NOTE
Patient admitted from ED via cart accompanied by family. Patient denies pain. Patient has dry, hacking cough. Patient states he feels weak, fatigued, and has had a poor appetite. Patient oriented to room. Call light within reach.

## 2024-07-17 NOTE — ED PROVIDER NOTES
HPI   Chief Complaint   Patient presents with    Fever    Cough    Weakness, Gen       HPI  Patient is an 84-year-old male presenting to the ED today for fever, cough, and generalized weakness.  Patient states that he has been having symptoms for about the past 5 days.  He did see his PCP 5 days ago with the onset of his symptoms and was started on antibiotics.  However, patient did not like the way his antibiotics were making him feel so he discontinued them on his own 3 days ago.  Since then, he has continued to have cough, generalized weakness, and decreased energy, so he was brought to the ED for further management.  He otherwise denies any chest pain or shortness of breath.  He denies any abdominal pain, vomiting, or changes in bowel or bladder habits.  He is not on any blood thinners.  He denies any other sick contacts.      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    Plantar fasciitis 02/02/2023    S/P internal cardiac defibrillator procedure 02/02/2023     Past Surgical History:   Procedure Laterality Date    CARDIAC PACEMAKER PLACEMENT  04/26/2016    Pacemaker Placement    OTHER SURGICAL HISTORY  12/04/2013    Defibrillation    TONSILLECTOMY  12/04/2013    Tonsillectomy     Family History   Problem Relation Name Age of Onset    Other (cardiac disorder) Mother      Other (cardiac disorder) Other Maternal Uncle      Social History     Tobacco Use    Smoking status: Never    Smokeless tobacco: Never   Vaping  Use    Vaping status: Never Used   Substance Use Topics    Alcohol use: Never    Drug use: Never       Physical Exam   ED Triage Vitals [07/17/24 1154]   Temperature Heart Rate Respirations BP   (!) 38 °C (100.4 °F) 100 16 (!) 161/91      Pulse Ox Temp src Heart Rate Source Patient Position   95 % -- -- --      BP Location FiO2 (%)     -- --       Physical Exam  Vitals and nursing note reviewed.   Constitutional:       General: He is not in acute distress.     Appearance: He is not toxic-appearing.   HENT:      Head: Normocephalic.      Mouth/Throat:      Mouth: Mucous membranes are moist.   Eyes:      Extraocular Movements: Extraocular movements intact.      Conjunctiva/sclera: Conjunctivae normal.   Cardiovascular:      Rate and Rhythm: Normal rate and regular rhythm.      Pulses: Normal pulses.   Pulmonary:      Effort: Pulmonary effort is normal. No respiratory distress.      Breath sounds: Normal breath sounds. No wheezing.   Abdominal:      General: There is no distension.      Palpations: Abdomen is soft.      Tenderness: There is no abdominal tenderness.   Musculoskeletal:         General: No swelling.      Cervical back: Neck supple.   Skin:     General: Skin is warm and dry.      Capillary Refill: Capillary refill takes less than 2 seconds.   Neurological:      General: No focal deficit present.      Mental Status: He is alert. Mental status is at baseline.           ED Course & MDM   ED Course as of 07/19/24 0321 Wed Jul 17, 2024   1230 EKG obtained at 1201, interpreted by myself.  Normal sinus rhythm with a ventricular rate of 98, left axis deviation, left bundle branch block present, no acute ischemic changes [VT]      ED Course User Index  [VT] Robyn HENRY MD         Diagnoses as of 07/19/24 0321   Sepsis with acute hypoxic respiratory failure without septic shock, due to unspecified organism (Multi)   Hyponatremia   Pneumonia of right lower lobe due to infectious organism                        [place] Coma Scale Score: 15                        Medical Decision Making  Patient was seen and evaluated for fever, cough, generalized weakness.  Differential diagnosis includes but is not limited to pneumonia, pneumothorax, COPD exacerbation, CHF exacerbation, PE, URI, Viral illness, Anemia, Electrolyte abnormality.  On arrival, patient is oxygenating at 89% on room air.  Patient does not usually require supplemental oxygen.  Given his hypoxia, he is placed on 2 L nasal cannula.  Patient is also found to be febrile on arrival with a temperature of 100.4.  Patient is administered Tylenol 650 mg.  Blood pressure is otherwise unremarkable at 161/91.  Peripheral IV is established and patient is started on 30 cc/kg bolus of normal saline with concerns of sepsis.  Given recent outpatient antibiotics, he is also started on vancomycin, Zosyn, and azithromycin with concerns of pneumonia.  Additional labs and imaging are ordered for further evaluation of the patient's symptoms.    CBC shows leukocytosis with WBC of 16.3.  CMP shows hyponatremia with a sodium of 127.  Creatinine is elevated at 1.49, which appears to be consistent with the patient's baseline.  High-sensitivity troponin is normal at 13.  Lactic acid is normal at 1.  COVID-19 testing is negative.  XR chest 1 view   Final Result   Ill-defined opacity within the right lower lobe with underlying   stranding probably representing an evolving pneumonia.  Short-term   interval follow-up suggested.   Signed by Leeroy Cuevas MD        On reevaluation, patient is resting comfortably in bed.  He continues to require 2 L nasal cannula at this time.  Patient was informed of their lab and imaging results, and all questions and concerns were answered. Admission planning for further management was discussed at this time, to which the patient was agreeable.  I discussed the case with Anh Sanchez, DEVONTE on-call for the hospitalist service, who accepts patient for  admission under Dr. Dodd.      Procedure  Procedures     Robyn HENRY MD  07/19/24 0320

## 2024-07-17 NOTE — ED NOTES
Patient to ed with complaints of fever, cough and general weakness that has been ongoing for aprox 3-5 days. Patient say his pcp within the last week and was toldhis lungs sounded clear, however, his symptoms have progressively gotten worse. Patients room air sats was 87% according to ems; patient placed on 3l oxgen and sats increased to 95%.     Chelo Vegas RN  07/17/24 0326

## 2024-07-17 NOTE — CARE PLAN
The patient's goals for the shift include Eat dinner.    The clinical goals for the shift include Oxygen saturation will be greater than 92% and patient will be afebrile.

## 2024-07-18 LAB
ANION GAP SERPL CALC-SCNC: 13 MMOL/L (ref 10–20)
ATRIAL RATE: 98 BPM
BNP SERPL-MCNC: 60 PG/ML (ref 0–99)
BUN SERPL-MCNC: 22 MG/DL (ref 6–23)
CALCIUM SERPL-MCNC: 8.2 MG/DL (ref 8.6–10.3)
CHLORIDE SERPL-SCNC: 102 MMOL/L (ref 98–107)
CO2 SERPL-SCNC: 19 MMOL/L (ref 21–32)
CREAT SERPL-MCNC: 1.34 MG/DL (ref 0.5–1.3)
EGFRCR SERPLBLD CKD-EPI 2021: 52 ML/MIN/1.73M*2
ERYTHROCYTE [DISTWIDTH] IN BLOOD BY AUTOMATED COUNT: 14.8 % (ref 11.5–14.5)
GLUCOSE SERPL-MCNC: 109 MG/DL (ref 74–99)
HCT VFR BLD AUTO: 39.9 % (ref 41–52)
HGB BLD-MCNC: 13.3 G/DL (ref 13.5–17.5)
MCH RBC QN AUTO: 30.6 PG (ref 26–34)
MCHC RBC AUTO-ENTMCNC: 33.3 G/DL (ref 32–36)
MCV RBC AUTO: 92 FL (ref 80–100)
NRBC BLD-RTO: 0 /100 WBCS (ref 0–0)
P AXIS: 30 DEGREES
P OFFSET: 145 MS
P ONSET: 92 MS
PLATELET # BLD AUTO: 171 X10*3/UL (ref 150–450)
POTASSIUM SERPL-SCNC: 4.3 MMOL/L (ref 3.5–5.3)
PR INTERVAL: 236 MS
PROCALCITONIN SERPL-MCNC: 1.68 NG/ML
Q ONSET: 210 MS
QRS COUNT: 17 BEATS
QRS DURATION: 144 MS
QT INTERVAL: 362 MS
QTC CALCULATION(BAZETT): 462 MS
QTC FREDERICIA: 426 MS
R AXIS: -58 DEGREES
RBC # BLD AUTO: 4.35 X10*6/UL (ref 4.5–5.9)
SODIUM SERPL-SCNC: 130 MMOL/L (ref 136–145)
T AXIS: 95 DEGREES
T OFFSET: 391 MS
VENTRICULAR RATE: 98 BPM
WBC # BLD AUTO: 11.2 X10*3/UL (ref 4.4–11.3)

## 2024-07-18 PROCEDURE — 2500000004 HC RX 250 GENERAL PHARMACY W/ HCPCS (ALT 636 FOR OP/ED): Mod: IPSPLIT

## 2024-07-18 PROCEDURE — 99232 SBSQ HOSP IP/OBS MODERATE 35: CPT | Performed by: NURSE PRACTITIONER

## 2024-07-18 PROCEDURE — 2500000001 HC RX 250 WO HCPCS SELF ADMINISTERED DRUGS (ALT 637 FOR MEDICARE OP): Mod: IPSPLIT

## 2024-07-18 PROCEDURE — 2500000002 HC RX 250 W HCPCS SELF ADMINISTERED DRUGS (ALT 637 FOR MEDICARE OP, ALT 636 FOR OP/ED): Mod: IPSPLIT

## 2024-07-18 PROCEDURE — 2500000004 HC RX 250 GENERAL PHARMACY W/ HCPCS (ALT 636 FOR OP/ED): Mod: IPSPLIT | Performed by: NURSE PRACTITIONER

## 2024-07-18 PROCEDURE — 94640 AIRWAY INHALATION TREATMENT: CPT | Mod: IPSPLIT

## 2024-07-18 PROCEDURE — 94760 N-INVAS EAR/PLS OXIMETRY 1: CPT | Mod: IPSPLIT

## 2024-07-18 PROCEDURE — 85027 COMPLETE CBC AUTOMATED: CPT | Mod: IPSPLIT | Performed by: NURSE PRACTITIONER

## 2024-07-18 PROCEDURE — 2500000001 HC RX 250 WO HCPCS SELF ADMINISTERED DRUGS (ALT 637 FOR MEDICARE OP): Mod: IPSPLIT | Performed by: INTERNAL MEDICINE

## 2024-07-18 PROCEDURE — 1200000002 HC GENERAL ROOM WITH TELEMETRY DAILY: Mod: IPSPLIT

## 2024-07-18 PROCEDURE — 84145 PROCALCITONIN (PCT): CPT | Mod: GENLAB

## 2024-07-18 PROCEDURE — 2500000001 HC RX 250 WO HCPCS SELF ADMINISTERED DRUGS (ALT 637 FOR MEDICARE OP): Mod: IPSPLIT | Performed by: NURSE PRACTITIONER

## 2024-07-18 PROCEDURE — 36415 COLL VENOUS BLD VENIPUNCTURE: CPT | Mod: IPSPLIT

## 2024-07-18 PROCEDURE — 97165 OT EVAL LOW COMPLEX 30 MIN: CPT | Mod: GO,IPSPLIT | Performed by: OCCUPATIONAL THERAPIST

## 2024-07-18 PROCEDURE — 97161 PT EVAL LOW COMPLEX 20 MIN: CPT | Mod: GP,IPSPLIT | Performed by: PHYSICAL THERAPIST

## 2024-07-18 PROCEDURE — 80048 BASIC METABOLIC PNL TOTAL CA: CPT | Mod: IPSPLIT | Performed by: NURSE PRACTITIONER

## 2024-07-18 PROCEDURE — 9420000001 HC RT PATIENT EDUCATION 5 MIN: Mod: IPSPLIT

## 2024-07-18 PROCEDURE — C9113 INJ PANTOPRAZOLE SODIUM, VIA: HCPCS | Mod: IPSPLIT | Performed by: NURSE PRACTITIONER

## 2024-07-18 PROCEDURE — 94664 DEMO&/EVAL PT USE INHALER: CPT | Mod: IPSPLIT

## 2024-07-18 PROCEDURE — 2500000005 HC RX 250 GENERAL PHARMACY W/O HCPCS: Mod: IPSPLIT | Performed by: NURSE PRACTITIONER

## 2024-07-18 PROCEDURE — 83880 ASSAY OF NATRIURETIC PEPTIDE: CPT | Mod: IPSPLIT

## 2024-07-18 PROCEDURE — 2500000005 HC RX 250 GENERAL PHARMACY W/O HCPCS: Mod: IPSPLIT

## 2024-07-18 RX ORDER — CEFTRIAXONE 2 G/50ML
2 INJECTION, SOLUTION INTRAVENOUS EVERY 24 HOURS
Status: DISCONTINUED | OUTPATIENT
Start: 2024-07-18 | End: 2024-07-19 | Stop reason: HOSPADM

## 2024-07-18 RX ORDER — IPRATROPIUM BROMIDE AND ALBUTEROL SULFATE 2.5; .5 MG/3ML; MG/3ML
3 SOLUTION RESPIRATORY (INHALATION)
Status: DISCONTINUED | OUTPATIENT
Start: 2024-07-18 | End: 2024-07-19 | Stop reason: HOSPADM

## 2024-07-18 RX ORDER — SODIUM CHLORIDE 9 MG/ML
10 INJECTION, SOLUTION INTRAVENOUS CONTINUOUS PRN
Status: DISCONTINUED | OUTPATIENT
Start: 2024-07-18 | End: 2024-07-19 | Stop reason: HOSPADM

## 2024-07-18 RX ORDER — GUAIFENESIN 100 MG/5ML
200 SOLUTION ORAL EVERY 4 HOURS PRN
Status: DISCONTINUED | OUTPATIENT
Start: 2024-07-18 | End: 2024-07-19 | Stop reason: HOSPADM

## 2024-07-18 RX ORDER — ALBUTEROL SULFATE 0.83 MG/ML
2.5 SOLUTION RESPIRATORY (INHALATION) EVERY 2 HOUR PRN
Status: DISCONTINUED | OUTPATIENT
Start: 2024-07-18 | End: 2024-07-19 | Stop reason: HOSPADM

## 2024-07-18 ASSESSMENT — COGNITIVE AND FUNCTIONAL STATUS - GENERAL
MOBILITY SCORE: 20
DAILY ACTIVITIY SCORE: 22
MOVING TO AND FROM BED TO CHAIR: A LITTLE
WALKING IN HOSPITAL ROOM: A LITTLE
DAILY ACTIVITIY SCORE: 24
CLIMB 3 TO 5 STEPS WITH RAILING: A LITTLE
CLIMB 3 TO 5 STEPS WITH RAILING: A LITTLE
TOILETING: A LITTLE
MOBILITY SCORE: 22
DRESSING REGULAR LOWER BODY CLOTHING: A LITTLE
STANDING UP FROM CHAIR USING ARMS: A LITTLE
WALKING IN HOSPITAL ROOM: A LITTLE

## 2024-07-18 ASSESSMENT — ACTIVITIES OF DAILY LIVING (ADL)
LACK_OF_TRANSPORTATION: NO
BATHING_ASSISTANCE: INDEPENDENT
ADL_ASSISTANCE: INDEPENDENT
ADL_ASSISTANCE: INDEPENDENT

## 2024-07-18 ASSESSMENT — PAIN - FUNCTIONAL ASSESSMENT
PAIN_FUNCTIONAL_ASSESSMENT: 0-10

## 2024-07-18 ASSESSMENT — PAIN SCALES - GENERAL
PAINLEVEL_OUTOF10: 0 - NO PAIN

## 2024-07-18 NOTE — NURSING NOTE
Contacted Cristy Drummond for order for cough syrup and drops, patient has a bad dry cough, new orders. See mar

## 2024-07-18 NOTE — CARE PLAN
The patient's goals for the shift include going home.    The clinical goals for the shift include maintaining oxygen saturation greater than 92%, reducing cough, encouraging rest and relaxation, remaining afebrile, continue IV antibiotics, encourage by mouth intake, provide assistance with ADLs for weakness and fatigue, and providing podiatry recommendation for family re: care for toenails.

## 2024-07-18 NOTE — CONSULTS
Vancomycin Dosing by Pharmacy- Cessation of Therapy    Consult to pharmacy for vancomycin dosing has been discontinued by the prescriber, pharmacy will sign off at this time.    Please call pharmacy if there are further questions or re-enter a consult if vancomycin is resumed.     Yamilet Reyes, PharmD, BCPS  Clinical Pharmacy Coordinator   [facility] (on-site: Mon-Tues)  [facility] (on-site: Wed-Fri)  Available via EPIC Secure Chat

## 2024-07-18 NOTE — CARE PLAN
The patient's goals for the shift include Eat dinner.    The clinical goals for the shift include Pt ill recieve iv atb during shift    Over the shift, the patient did not make progress toward the following goals. Patient ate very little for dinner and declined any snacks. Patient received IV atb during shift. Patient was afebrile during shift. Patient is voicing he wants to go home now, educated why it is important for him to receive IV atb since by mouth didn't work.

## 2024-07-18 NOTE — DISCHARGE INSTRUCTIONS
Thank you for choosing [facility] for your recent healthcare needs! We hope that you found comfort and confidence in the care that we provided and were able to take advantage of the many unique services that [place] has to offer! We are committed to making sure that you had an exceptional experience and successful discharge transition.   If you need any clarification of your discharge instructions or have any other questions related to your stay, please feel free to call: Gary (Nurse Manager; [phone]) or Shira (Assistant Nurse Manager; [phone]) from Monday-Friday 7am-3pm or the Nursing Supervisor during all other times ([phone]).   Best wishes that you will soon be back to doing all the things you love!

## 2024-07-18 NOTE — CARE PLAN
The patient's goals for the shift include going home.     The clinical goals for the shift include maintaining oxygen saturation greater than 92%, reducing cough, encouraging rest and relaxation, remaining afebrile, continue IV antibiotics, encourage by mouth intake, provide assistance with ADLs for weakness and fatigue, and providing podiatry recommendation for family re: care for toenails.       Patient was unable to go home today and will need to stay a couple more days. Patient was unable to tolerate BY MOUTH antibiotics at home. Patient off oxygen all day. Patient still coughing, but refusing cough syrup and lozenges. Patient taking Tessalon perles. PHYSICAL THERAPY/OCCUPATIONAL THERAPY evals completed. Patient was up in chair for lunch. Patient is still weak, fatigued, and unsteady at times. Patient afebrile, but flushed at times. Antibiotics changed to Rocephin and Azithromycin; education provided to patient and wife. Son and daughter updated over phone. Providers made aware of family's request for a podiatry referral. Advanced directives brought in by family and added to chart.

## 2024-07-18 NOTE — CONSULTS
Nutrition Assessment Note  Nutrition Assessment      Reason for Assessment  Reason for Assessment: Admission nursing screening (MST)    HPI: Pt presented with fever, cough, shortness of breath, and generalized weakness. Pt was experiencing symptoms for 5 days. PMH includes GERD, pacemaker, and HTN.     History:  Food and Nutrient History  Energy Intake: Poor < 50 %  Food and Nutrient History: Pt was sitting in [place] at time of visit with his wife. He states that his appetite has been low since last Friday when he was at the doctors. This morning for breakfast pt reports eating 50% of omlette and yogurt. He had just ordered lunch at time of visit. Prior to poor PO, 2 meals per day was normal. Pt wife does all the cooking and pt states that they eat very well. Pt was not sure of any weight changes and report UBW of 150lb. Pt agreeable to chocolate Ensure while admitted.       Wt Readings from Last 10 Encounters:   07/18/24 67.2 kg (148 lb 1.6 oz)   07/10/24 68.4 kg (150 lb 12.8 oz)   01/05/24 71.8 kg (158 lb 3.2 oz)   09/29/23 67.8 kg (149 lb 8 oz)   06/22/23 71.6 kg (157 lb 12.8 oz)   06/01/23 75.6 kg (166 lb 9.6 oz)   04/17/23 77.9 kg (171 lb 12.8 oz)   03/16/23 76.5 kg (168 lb 9.6 oz)   01/31/23 75.4 kg (166 lb 2 oz)   11/30/22 74.1 kg (163 lb 6 oz)     Nutrition History:  Food Allergies/Intolerances:  None  Energy intake: Energy Intake: Poor < 50 %  GI Symptoms: None     Oral Problems: None    Objective Data:  Nutrition Significant Labs:  Glucose 109 (H)  Sodium 130 (L)  Bicarb 19 (L)  Creatinine 1.34 (L)  GFR 52 (L)  Calcium 8.2 (L)  H/H 13.3/39.9 (L)    Nutrition Specific Mediations:  Medications reviewed.     I/O:     Intake/Output Summary (Last 24 hours) at 7/18/2024 1302  Last data filed at 7/18/2024 0611  Gross per 24 hour   Intake 838.76 ml   Output 875 ml   Net -36.24 ml       Dietary Orders (From admission, onward)       Start     Ordered    07/18/24 1255  Oral nutritional supplements  Until discontinued    "     Comments: Chocolate Ensure with breakfast and dinner please!   Question Answer Comment   Deliver with Breakfast    Deliver with Dinner    Select supplement: Ensure Plus High Protein        07/18/24 1254    07/17/24 1948  Adult diet Cardiac; 70 gm fat; 2 - 3 grams Sodium  Diet effective now        Question Answer Comment   Diet type Cardiac    Fat restriction: 70 gm fat    Sodium restriction: 2 - 3 grams Sodium        07/17/24 1947 07/17/24 1934  May Participate in Room Service With Assistance  Once        Question:  .  Answer:  Yes    07/17/24 1933                    Anthropometrics:  Height: 165.1 cm (5' 5\")  Weight: 67.2 kg (148 lb 1.6 oz)  BMI (Calculated): 24.65    Weight Change: -0.03    Weight Change  Weight History / % Weight Change: 1/5/24- 71.8kg; 7/10/24- 68.4kg; 7/18/24-67.2kg- 2% loss in 1 week.  Significant Weight Loss: Yes       IBW/kg (Dietitian Calculated): 61.81 kg  Percent of IBW: 108 %       Energy Needs:  Calculated Energy Needs Using Equations  Height: 165.1 cm (5' 5\")  Temp: 36 °C (96.8 °F)    Estimated Energy Needs  Total Energy Estimated Needs (kCal): 1949 kCal (1882-2016kcal)  Method for Estimating Needs: 28kcal/kg actual BW    Estimated Protein Needs  Total Protein Estimated Needs (g): 74 g (67-81)  Method for Estimating Needs: 1.0-1.2 g/kg actual BW    Estimated Fluid Needs  Total Fluid Estimated Needs (mL): 1949 mL  Method for Estimating Needs: 1ml/kcal       Nutrition Focused Physical Findings:  Subcutaneous Fat Loss  Orbital Fat Pads: Mild-Moderate (slight dark circles and slight hollowing)  Buccal Fat Pads: Well nourished (full, rounded cheeks)  Triceps: Well nourished (ample fat tissue)  Ribs: Defer    Muscle Wasting  Pectoralis (Clavicular Region): Mild-Moderate (some protrusion of clavicle)  Deltoid/Trapezius: Mild-Moderate (slight protrusion of acromion process)  Interosseous: Well nourished (muscle bulges)  Trapezius/Infraspinatus/Supraspinatus (Scapular Region): " Defer  Quadriceps: Defer  Gastrocnemius: Well nourished (well developed bulbous muscle)    Edema  Edema: none       Physical Findings (Nutrition Deficiency/Toxicity)  Hair: Negative  Eyes: Negative  Nails: Negative  Skin: Negative       Nutrition Diagnosis   Malnutrition Diagnosis  Patient has Malnutrition Diagnosis: Yes  Diagnosis Status: New  Malnutrition Diagnosis: Moderate malnutrition related to acute disease or injury  As Evidenced by: 1.7% unintentional weight loss in 1 week, Consuming <50% estimated needs for 6 days, Sepsis with acute respritory failure, mild subcutaneous fat loss of orbital fat pads and buccal fat pads, mild muscle loss of pectoralis, deltoid, and gastrcnemius, and poor appetite.           Nutrition Interventions/Recommendations   Nutrition Prescription  Individualized Nutrition Prescription Provided for : Diet, Fluids, ONS    Food and/or Nutrient Delivery Interventions  Meals and Snacks: Energy-modified diet  Goal: Pt to consume >75% of meals for adequate kcal and protein needs.      Medical Food Supplement: Commercial beverage  Goal: Ensure Plus High Protein 2x/day (700kcal, 40g pro).      Coordination of Nutrition Care by a Nutrition Professional  Collaboration and Referral of Nutrition Care: Collaboration by nutrition professional with other providers  Goal: Spoke with nurse.      Nutrition Monitoring and Evaluation   Food and Nutrient Related History  Energy Intake: Estimated energy intake  Criteria: Pt to consume >75% meals and ONS to meet estimated needs     Anthropometrics: Body Composition/Growth/Weight History  Weight: Measured weight  Criteria: Monitor weight changes     Biochemical Data, Medical Tests and Procedures  Electrolyte and Renal Panel: Sodium, Creatinine, Calcium, serum  Criteria: Labs WNL     Glucose/Endocrine Profile: Glucose, casual  Criteria: Labs WNL    Nutritional Anemia Profile: Hemoglobin, Hematocrit  Criteria: Labs WNL      Evaluate nutrition intervention as  compared to nutrition goal(s) and estimated nutrient need criteria.       Follow Up  Time Spent (min): 60 minutes  Last Date of Nutrition Visit: 07/18/24  Nutrition Follow-Up Needed?: Dietitian to reassess per policy  Follow up Comment: Mod PCM, ONS, Wt.

## 2024-07-18 NOTE — NURSING NOTE
Family at bedside with patient. Educated patient and family on POC. Patient has a dry non productive cough. Medicated per mar. Denies pain. Is on 4L of 02 and is receiving iv atb.

## 2024-07-18 NOTE — NURSING NOTE
Living Will and Healthcare POA paperwork brought in last night on previous shift and added to chart.

## 2024-07-18 NOTE — NURSING NOTE
Patient assisted to BSC for a BM after eating lunch. Patient states nothing tastes right. Tessalon perle given for persistent dry cough. Wife at bedside.

## 2024-07-18 NOTE — PROGRESS NOTES
Physical Therapy    Physical Therapy Evaluation    Patient Name: Terrance Armijo  MRN: [id number]  Today's Date: 7/18/2024   Time Calculation  Start Time: 1020  Stop Time: 1036  Time Calculation (min): 16 min    Assessment/Plan   PT Assessment  PT Assessment Results: Decreased strength, Decreased endurance, Impaired balance, Decreased mobility  Rehab Prognosis: Good  Barriers to Discharge: none  Evaluation/Treatment Tolerance: Patient tolerated treatment well  Strengths: Ability to acquire knowledge  Assessment Comment: Pleasant 84 y.o presents with weakness. Pt. lives with spouse and is normally IND. Pt. currently requires close supervision with SC and would benefit from additional PT to address above noted limitations and prevent further decline.  End of Session Patient Position: Up in chair, Alarm on  IP OR SWING BED PT PLAN  Inpatient or Swing Bed: Inpatient  PT Plan  Treatment/Interventions: Transfer training, Gait training, Stair training, Balance training, Strengthening, Therapeutic exercise, Therapeutic activity, Home exercise program  PT Plan: Ongoing PT  PT Frequency: 3 times per week  PT Discharge Recommendations: Low intensity level of continued care  Equipment Recommended upon Discharge: Straight cane  PT Recommended Transfer Status: Stand by assist  PT - OK to Discharge: Yes  Based on completed evaluation and care plan recommendations, no barriers to discharge to next site of care        Subjective   General Visit Information:  General  Reason for Referral: impaired mobility, sepsis with hypoxic respiratory failure  Referred By: LONNIE Mojica-CNP  Past Medical History Relevant to Rehab:  Basal cell carcinoma of skin of unspecified parts of face 12/24/2013    Basal cell carcinoma of skin of face   Dermatitis 02/02/2023   Essential (primary) hypertension 11/30/2022    Benign hypertension   Gastro-esophageal reflux disease without esophagitis 09/27/2016    Gastroesophageal reflux disease    Melanocytic nevi of trunk 10/12/2022   Neoplasm of uncertain behavior of skin 10/12/2022   Other conditions influencing health status 06/09/2017    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 Approx. 8 steps to bedroom/bathroom with 1 rail and 10 steps to basement. Pt. states he plans on getting 2nd handrail installed)  Bathroom Shower/Tub: Tub/shower unit  Bathroom Toilet: Handicapped height  Prior Level of Function:  Prior Function Per Pt/Caregiver Report  Level of [place]: Independent with ADLs and functional transfers, Independent with homemaking with ambulation  ADL Assistance: Independent  Homemaking Assistance: Independent  Ambulatory Assistance: Independent  Precautions:  Precautions  Medical Precautions: Fall precautions (reports no recent falls)  Vital Signs:  Vital Signs  Heart Rate: 94  SpO2: 94 %  BP: 133/59    Objective   Pain:  Pain Assessment  Pain Assessment: 0-10  0-10 (Numeric) Pain Score: 0 - No pain  Cognition:  Cognition  Overall Cognitive Status: Within Functional Limits    General Assessments:     Strength  Strength Comments: BLE: grossly 4/5  Coordination  Movements are Fluid and Coordinated: Yes    Static Standing Balance  Static Standing-Comment/Number of Minutes: Good; recommend SC  Dynamic Standing Balance  Dynamic Standing-Comments: Good-; recommend SC  Functional Assessments:  Transfer 1  Technique 1: Sit to stand, Stand to sit  Transfer Level of Assistance 1: Close supervision  Trials/Comments 1: x2    Ambulation/Gait  Training  Ambulation/Gait Training Performed: Yes  Ambulation/Gait Training 1  Gait Support Devices: Gait belt  Assistance 1: Hand held assistance (' ;150' with SC at close supervision)  Quality of Gait 1: Decreased step length (increased lateral sway at times; recommend SC for support)  Comments/Distance (ft) 1: 150' x 2    Stairs  Stairs: Yes (5 steps with 1 rail and cane at SBA)  Extremity/Trunk Assessments:     RLE   RLE : Within Functional Limits  Outcome Measures:  [facility] Basic Mobility  Turning from your back to your side while in a flat bed without using bedrails: None  Moving from lying on your back to sitting on the side of a flat bed without using bedrails: None  Moving to and from bed to chair (including a wheelchair): A little  Standing up from a chair using your arms (e.g. wheelchair or bedside chair): A little  To walk in hospital room: A little  Climbing 3-5 steps with railing: A little  Basic Mobility - Total Score: 20    FSS-ICU  Ambulation: Walks >/ or equal to 150 feet with supervision  Rolling: Complete independence  Sitting: Complete independence  Transfer Sit-to-Stand: Supervision or set-up only  Transfer Supine-to-Sit: Complete independence  Total Score: 31    Encounter Problems       Encounter Problems (Active)       Balance       STG - Maintains dynamic standing balance with upper extremity support with normal balance        Start:  07/18/24    Expected End:  08/01/24               Mobility       LTG - Patient will ambulate community distance ERENDIRA with cane        Start:  07/18/24    Expected End:  08/01/24            STG - Patient will ascend and descend a flight of stairs with 1 rail and cane       Start:  07/18/24    Expected End:  08/01/24               PT Transfers       STG - Patient will transfer sit to and from stand IND       Start:  07/18/24    Expected End:  08/01/24               Pain - Adult              Education Documentation  Mobility Training, taught by Sugey MATHEW  Clifton, PT at 7/18/2024 11:11 AM.  Learner: Patient  Readiness: Acceptance  Method: Explanation  Response: Verbalizes Understanding  Comment: Educated pt. on benefits of cane for fall prevention    Education Comments  No comments found.

## 2024-07-18 NOTE — PROGRESS NOTES
Terrance Armijo is a 84 y.o. male on day 1 of admission presenting with Sepsis with acute hypoxic respiratory failure without septic shock, due to unspecified organism (Multi).      Subjective   Patient assessed at bedside; lying in bed. He complained of feeling weak, tired, and feverish. He denies SOB, N/V.       Objective     Last Recorded Vitals  /59 (BP Location: Left arm, Patient Position: Lying)   Pulse 70   Temp 36.3 °C (97.3 °F) (Temporal)   Resp (!) 31   Wt 67.2 kg (148 lb 1.6 oz)   SpO2 97%   Intake/Output last 3 Shifts:    Intake/Output Summary (Last 24 hours) at 7/18/2024 0953  Last data filed at 7/18/2024 0611  Gross per 24 hour   Intake 838.76 ml   Output 875 ml   Net -36.24 ml       Admission Weight  Weight: 65.8 kg (145 lb) (07/17/24 1154)    Daily Weight  07/18/24 : 67.2 kg (148 lb 1.6 oz)    Image Results      Physical Exam  Vitals reviewed.   Constitutional:       Appearance: He is normal weight. He is ill-appearing.   HENT:      Head: Normocephalic and atraumatic.      Right Ear: External ear normal.      Left Ear: External ear normal.      Nose: Nose normal.      Mouth/Throat:      Mouth: Mucous membranes are moist.      Pharynx: Oropharynx is clear.   Eyes:      Conjunctiva/sclera: Conjunctivae normal.      Pupils: Pupils are equal, round, and reactive to light.   Cardiovascular:      Rate and Rhythm: Normal rate and regular rhythm.      Pulses: Normal pulses.      Heart sounds: Normal heart sounds.   Pulmonary:      Effort: Pulmonary effort is normal.      Breath sounds: Rhonchi present.   Abdominal:      General: Bowel sounds are normal.      Palpations: Abdomen is soft.   Musculoskeletal:         General: Normal range of motion.      Cervical back: Normal range of motion and neck supple.   Skin:     General: Skin is warm and dry.   Neurological:      General: No focal deficit present.      Mental Status: He is alert and oriented to person, place, and time.   Psychiatric:          Mood and Affect: Mood normal.         Behavior: Behavior normal.         Relevant Results    Scheduled medications  aspirin, 81 mg, oral, Daily  carvedilol, 12.5 mg, oral, BID  difluprednate, 1 drop, Left Eye, q12h  digoxin, 125 mcg, oral, Daily  dorzolamide, 1 drop, Left Eye, BID  enoxaparin, 40 mg, subcutaneous, q24h  guaiFENesin, 600 mg, oral, BID  ipratropium-albuteroL, 3 mL, nebulization, TID  oxygen, , inhalation, Continuous - Inhalation  pantoprazole, 40 mg, intravenous, Daily before breakfast  piperacillin-tazobactam, 3.375 g, intravenous, q6h  ruxolitinib, 1 Application, Topical, BID  sennosides-docusate sodium, 1 tablet, oral, BID  spironolactone, 50 mg, oral, Daily  vitamin E, 400 Units, oral, Daily      Continuous medications  sodium chloride 0.9%, 10 mL/hr      PRN medications  PRN medications: acetaminophen, albuterol, benzocaine-menthol, benzonatate, calcium carbonate, guaiFENesin, melatonin, ondansetron **OR** ondansetron, sodium chloride 0.9%    Results for orders placed or performed during the hospital encounter of 07/17/24 (from the past 24 hour(s))   ECG 12 lead   Result Value Ref Range    Ventricular Rate 98 BPM    Atrial Rate 98 BPM    DC Interval 236 ms    QRS Duration 144 ms    QT Interval 362 ms    QTC Calculation(Bazett) 462 ms    P Axis 30 degrees    R Axis -58 degrees    T Axis 95 degrees    QRS Count 17 beats    Q Onset 210 ms    P Onset 92 ms    P Offset 145 ms    T Offset 391 ms    QTC Fredericia 426 ms   Blood Culture    Specimen: Peripheral Venipuncture; Blood culture   Result Value Ref Range    Blood Culture Loaded on Instrument - Culture in progress    Sars-CoV-2 PCR   Result Value Ref Range    Coronavirus 2019, PCR Not Detected Not Detected   Comprehensive Metabolic Panel   Result Value Ref Range    Glucose 106 (H) 74 - 99 mg/dL    Sodium 127 (L) 136 - 145 mmol/L    Potassium 5.1 3.5 - 5.3 mmol/L    Chloride 96 (L) 98 - 107 mmol/L    Bicarbonate 21 21 - 32 mmol/L    Anion Gap 15  10 - 20 mmol/L    Urea Nitrogen 27 (H) 6 - 23 mg/dL    Creatinine 1.49 (H) 0.50 - 1.30 mg/dL    eGFR 46 (L) >60 mL/min/1.73m*2    Calcium 9.2 8.6 - 10.3 mg/dL    Albumin 3.8 3.4 - 5.0 g/dL    Alkaline Phosphatase 42 33 - 136 U/L    Total Protein 7.6 6.4 - 8.2 g/dL    AST 36 9 - 39 U/L    Bilirubin, Total 1.1 0.0 - 1.2 mg/dL    ALT 25 10 - 52 U/L   Lactate   Result Value Ref Range    Lactate 1.0 0.4 - 2.0 mmol/L   Troponin I, High Sensitivity   Result Value Ref Range    Troponin I, High Sensitivity 13 0 - 20 ng/L   Blood Culture    Specimen: Peripheral Venipuncture; Blood culture   Result Value Ref Range    Blood Culture Loaded on Instrument - Culture in progress    CBC with Differential   Result Value Ref Range    WBC 16.3 (H) 4.4 - 11.3 x10*3/uL    nRBC 0.0 0.0 - 0.0 /100 WBCs    RBC 5.22 4.50 - 5.90 x10*6/uL    Hemoglobin 16.1 13.5 - 17.5 g/dL    Hematocrit 46.9 41.0 - 52.0 %    MCV 90 80 - 100 fL    MCH 30.8 26.0 - 34.0 pg    MCHC 34.3 32.0 - 36.0 g/dL    RDW 14.6 (H) 11.5 - 14.5 %    Platelets 203 150 - 450 x10*3/uL    Neutrophils % 77.4 40.0 - 80.0 %    Immature Granulocytes %, Automated 0.7 0.0 - 0.9 %    Lymphocytes % 8.8 13.0 - 44.0 %    Monocytes % 13.0 2.0 - 10.0 %    Eosinophils % 0.0 0.0 - 6.0 %    Basophils % 0.1 0.0 - 2.0 %    Neutrophils Absolute 12.64 (H) 1.60 - 5.50 x10*3/uL    Immature Granulocytes Absolute, Automated 0.11 0.00 - 0.50 x10*3/uL    Lymphocytes Absolute 1.44 0.80 - 3.00 x10*3/uL    Monocytes Absolute 2.12 (H) 0.05 - 0.80 x10*3/uL    Eosinophils Absolute 0.00 0.00 - 0.40 x10*3/uL    Basophils Absolute 0.01 0.00 - 0.10 x10*3/uL   MRSA Surveillance for Vancomycin De-escalation, PCR    Specimen: Anterior Nares; Swab   Result Value Ref Range    MRSA PCR Not Detected Not Detected   Urinalysis with Reflex Microscopic   Result Value Ref Range    Color, Urine Yellow Light-Yellow, Yellow, Dark-Yellow    Appearance, Urine Clear Clear    Specific Gravity, Urine 1.017 1.005 - 1.035    pH, Urine  6.0 5.0, 5.5, 6.0, 6.5, 7.0, 7.5, 8.0    Protein, Urine 50 (1+) (A) NEGATIVE, 10 (TRACE), 20 (TRACE) mg/dL    Glucose, Urine Normal Normal mg/dL    Blood, Urine 0.03 (TRACE) (A) NEGATIVE    Ketones, Urine 10 (1+) (A) NEGATIVE mg/dL    Bilirubin, Urine NEGATIVE NEGATIVE    Urobilinogen, Urine Normal Normal mg/dL    Nitrite, Urine NEGATIVE NEGATIVE    Leukocyte Esterase, Urine NEGATIVE NEGATIVE   Microscopic Only, Urine   Result Value Ref Range    WBC, Urine NONE 1-5, NONE /HPF    RBC, Urine 1-2 NONE, 1-2, 3-5 /HPF    Mucus, Urine FEW Reference range not established. /LPF   Basic metabolic panel   Result Value Ref Range    Glucose 109 (H) 74 - 99 mg/dL    Sodium 130 (L) 136 - 145 mmol/L    Potassium 4.3 3.5 - 5.3 mmol/L    Chloride 102 98 - 107 mmol/L    Bicarbonate 19 (L) 21 - 32 mmol/L    Anion Gap 13 10 - 20 mmol/L    Urea Nitrogen 22 6 - 23 mg/dL    Creatinine 1.34 (H) 0.50 - 1.30 mg/dL    eGFR 52 (L) >60 mL/min/1.73m*2    Calcium 8.2 (L) 8.6 - 10.3 mg/dL   CBC   Result Value Ref Range    WBC 11.2 4.4 - 11.3 x10*3/uL    nRBC 0.0 0.0 - 0.0 /100 WBCs    RBC 4.35 (L) 4.50 - 5.90 x10*6/uL    Hemoglobin 13.3 (L) 13.5 - 17.5 g/dL    Hematocrit 39.9 (L) 41.0 - 52.0 %    MCV 92 80 - 100 fL    MCH 30.6 26.0 - 34.0 pg    MCHC 33.3 32.0 - 36.0 g/dL    RDW 14.8 (H) 11.5 - 14.5 %    Platelets 171 150 - 450 x10*3/uL   B-type natriuretic peptide   Result Value Ref Range    BNP 60 0 - 99 pg/mL                   Assessment/Plan   This patient currently has cardiac telemetry ordered; if you would like to modify or discontinue the telemetry order, click here to go to the orders activity to modify/discontinue the order.    Principal Problem:    Sepsis with acute hypoxic respiratory failure without septic shock, due to unspecified organism (Multi)  Active Problems:    Benign hypertension    Cardiac defibrillator in place    Congestive heart failure (Multi)    Gastroesophageal reflux disease    Nonischemic cardiomyopathy (Multi)     Psoriasis    Retinal detachment    Acute iritis    Pneumonia of right lower lobe due to infectious organism    Hyponatremia    AGNIESZKA (acute kidney injury) (CMS-[facility])    Stage 3a chronic kidney disease (Multi)    Weakness      Sepsis with acute hypoxic respiratory failure without septic shock, due to unspecified organism (Multi)  Community acquired bacterial pneumonia  Failure of outpatient treatment  - WBC 16.3 > 11.2  - Blood culture negative to date  - Covid Negative  - Lactate 1.0  - Procalcitonin Ordered  - Respiratory Culture Ordered  - CXR : Ill-defined opacity within the right lower lobe with underlying  stranding probably representing an evolving pneumonia.  Short-term  interval follow-up suggested.  - continue guaifenesin 600 mg PO BID   - azithromycin 500 mg IV x2 given in ED  - continue azithromycin 500 mg daily; day 2   - discontinued Zosyn 3.375 g IV Q 6 hours (Day 1)  - discontinue Vancomycin Pharmacy to Dose (Day 1)  - started ceftriaxone 2 gm daily; day 1  - supplemental oxygen to keep SpO2 > 90%  - Home O2 RA  - currently on RA     Benign hypertension  Cardiac defibrillator in place  Congestive heart failure (Multi)  Nonischemic cardiomyopathy (Multi)  - Troponin 13  - BNP 60  - continue aspirin 81  mg PO Daily   - continue Digoxin 125 mcg PO Daily   - continue Aldactone 50 mg PO Daily   - continue Carvedilol 12.5 PO BID   - monitor BP     Hyponatremia, improving  - Sodium 127; today 130  - NS 3,000 ml IV given in ED   - monitor BMP     Acute on Chronic Renal Failure, stage 3, resolved  - baseline creatine +/- 1.3  - creatine on admission 1.49; today 1.34  - NS 3,000 ml IV given in ED      Weakness  -PT/OT evaluation; patient refused     Gastroesophageal reflux disease  - continue pantoprazole 40 mg PO/IV Daily      Retinal detachment  Acute iritis  - continue Durezol 0.05% 1 drop BID, Left Eye   - continue Trusopt 2% 1 drop BID, Left Eye   - continue Pred-Forte 1% drop BID, Left Eye      Psoriasis  -  continue Opzelura cream BID  - continue Protopic 0.1% oint BID     DVT ppx  - continue enoxapairn     Code Status: Full Code    Disposition: Patient requires more than 2 inpatient days.    Seen with and case discussed with MD Monika Cordero, APRN-CNP

## 2024-07-18 NOTE — DISCHARGE INSTR - OTHER ORDERS
Thank you for choosing [facility] for your Health Care needs.  Also, thank you for allowing us to take you and your families preferences into account when determining your discharge plan.  Stay well!    Your Care Transitions Team Member:Mickey Levy [phone]    For questions about your medications listed on your discharge instructions, please call the Nurses Station at [phone].

## 2024-07-18 NOTE — PROGRESS NOTES
07/18/24 1217   Discharge Planning   Living Arrangements Spouse/significant other   Support Systems Spouse/significant other   Assistance Needed Independent with ADLS, AIDLS, AMBULATION, AND DRIVES.  Works part time at [employer].  No DME per patient.   Type of Residence Private residence  (2 story house)   Number of Stairs to Enter Residence 3   Number of Stairs Within Residence 12   Do you have animals or pets at home? No   Home or Post Acute Services None   Expected Discharge Disposition Home   Does the patient need discharge transport arranged? No  (wife)     Confirmed PCP is Isaura Chacko MD and pharmacy is [facility].  Patient says he is independent and denies any home going needs.  Patient upgraded to inpatient. IMM letter given and explained to patient. Consent/signature obtained and copy given to patient.  TCC  following.      2:15 pm   Spoke with patient and his wife about therapy's low level therapy recommendation.  Patient declined home care but is agreeable to receive a prescription for outpatient therapy.  He said he will go to the  outpatient next to hospital.  Plan for patient to return home with RX for outpatient therapy.  Updated medical staff and nursing.  Will follow.

## 2024-07-19 VITALS
DIASTOLIC BLOOD PRESSURE: 76 MMHG | WEIGHT: 147.1 LBS | HEIGHT: 65 IN | SYSTOLIC BLOOD PRESSURE: 123 MMHG | OXYGEN SATURATION: 94 % | TEMPERATURE: 96.7 F | HEART RATE: 83 BPM | RESPIRATION RATE: 14 BRPM | BODY MASS INDEX: 24.51 KG/M2

## 2024-07-19 LAB
ANION GAP SERPL CALC-SCNC: 14 MMOL/L (ref 10–20)
BUN SERPL-MCNC: 28 MG/DL (ref 6–23)
CALCIUM SERPL-MCNC: 8.8 MG/DL (ref 8.6–10.3)
CHLORIDE SERPL-SCNC: 103 MMOL/L (ref 98–107)
CO2 SERPL-SCNC: 18 MMOL/L (ref 21–32)
CREAT SERPL-MCNC: 1.12 MG/DL (ref 0.5–1.3)
EGFRCR SERPLBLD CKD-EPI 2021: 65 ML/MIN/1.73M*2
ERYTHROCYTE [DISTWIDTH] IN BLOOD BY AUTOMATED COUNT: 14.7 % (ref 11.5–14.5)
GLUCOSE SERPL-MCNC: 87 MG/DL (ref 74–99)
HCT VFR BLD AUTO: 40.1 % (ref 41–52)
HGB BLD-MCNC: 13.4 G/DL (ref 13.5–17.5)
MCH RBC QN AUTO: 30.2 PG (ref 26–34)
MCHC RBC AUTO-ENTMCNC: 33.4 G/DL (ref 32–36)
MCV RBC AUTO: 91 FL (ref 80–100)
NRBC BLD-RTO: 0 /100 WBCS (ref 0–0)
PLATELET # BLD AUTO: 178 X10*3/UL (ref 150–450)
POTASSIUM SERPL-SCNC: 4.4 MMOL/L (ref 3.5–5.3)
RBC # BLD AUTO: 4.43 X10*6/UL (ref 4.5–5.9)
SODIUM SERPL-SCNC: 131 MMOL/L (ref 136–145)
WBC # BLD AUTO: 8.1 X10*3/UL (ref 4.4–11.3)

## 2024-07-19 PROCEDURE — 2500000002 HC RX 250 W HCPCS SELF ADMINISTERED DRUGS (ALT 637 FOR MEDICARE OP, ALT 636 FOR OP/ED): Mod: IPSPLIT | Performed by: NURSE PRACTITIONER

## 2024-07-19 PROCEDURE — 2500000004 HC RX 250 GENERAL PHARMACY W/ HCPCS (ALT 636 FOR OP/ED): Mod: IPSPLIT | Performed by: NURSE PRACTITIONER

## 2024-07-19 PROCEDURE — 2500000002 HC RX 250 W HCPCS SELF ADMINISTERED DRUGS (ALT 637 FOR MEDICARE OP, ALT 636 FOR OP/ED): Mod: IPSPLIT

## 2024-07-19 PROCEDURE — 36415 COLL VENOUS BLD VENIPUNCTURE: CPT | Mod: IPSPLIT | Performed by: NURSE PRACTITIONER

## 2024-07-19 PROCEDURE — C9113 INJ PANTOPRAZOLE SODIUM, VIA: HCPCS | Mod: IPSPLIT | Performed by: NURSE PRACTITIONER

## 2024-07-19 PROCEDURE — 97116 GAIT TRAINING THERAPY: CPT | Mod: GP,CQ,IPSPLIT

## 2024-07-19 PROCEDURE — 2500000004 HC RX 250 GENERAL PHARMACY W/ HCPCS (ALT 636 FOR OP/ED): Mod: IPSPLIT

## 2024-07-19 PROCEDURE — 2500000001 HC RX 250 WO HCPCS SELF ADMINISTERED DRUGS (ALT 637 FOR MEDICARE OP): Mod: IPSPLIT | Performed by: NURSE PRACTITIONER

## 2024-07-19 PROCEDURE — 80048 BASIC METABOLIC PNL TOTAL CA: CPT | Mod: IPSPLIT | Performed by: NURSE PRACTITIONER

## 2024-07-19 PROCEDURE — 85027 COMPLETE CBC AUTOMATED: CPT | Mod: IPSPLIT | Performed by: NURSE PRACTITIONER

## 2024-07-19 PROCEDURE — 99239 HOSP IP/OBS DSCHRG MGMT >30: CPT | Performed by: NURSE PRACTITIONER

## 2024-07-19 PROCEDURE — 94640 AIRWAY INHALATION TREATMENT: CPT | Mod: IPSPLIT

## 2024-07-19 PROCEDURE — 97110 THERAPEUTIC EXERCISES: CPT | Mod: GP,CQ,IPSPLIT

## 2024-07-19 PROCEDURE — 2500000005 HC RX 250 GENERAL PHARMACY W/O HCPCS: Mod: IPSPLIT

## 2024-07-19 PROCEDURE — 2500000001 HC RX 250 WO HCPCS SELF ADMINISTERED DRUGS (ALT 637 FOR MEDICARE OP): Mod: IPSPLIT

## 2024-07-19 RX ORDER — AZITHROMYCIN 500 MG/1
500 TABLET, FILM COATED ORAL DAILY
Qty: 2 TABLET | Refills: 0 | Status: SHIPPED | OUTPATIENT
Start: 2024-07-19 | End: 2024-07-21

## 2024-07-19 RX ORDER — AZITHROMYCIN 250 MG/1
500 TABLET, FILM COATED ORAL
Status: DISCONTINUED | OUTPATIENT
Start: 2024-07-19 | End: 2024-07-19 | Stop reason: HOSPADM

## 2024-07-19 RX ORDER — CEFUROXIME AXETIL 500 MG/1
500 TABLET ORAL 2 TIMES DAILY
Qty: 14 TABLET | Refills: 0 | Status: SHIPPED | OUTPATIENT
Start: 2024-07-19 | End: 2024-07-26

## 2024-07-19 ASSESSMENT — PAIN - FUNCTIONAL ASSESSMENT
PAIN_FUNCTIONAL_ASSESSMENT: 0-10
PAIN_FUNCTIONAL_ASSESSMENT: 0-10

## 2024-07-19 ASSESSMENT — COGNITIVE AND FUNCTIONAL STATUS - GENERAL
CLIMB 3 TO 5 STEPS WITH RAILING: A LITTLE
MOVING TO AND FROM BED TO CHAIR: A LITTLE
MOBILITY SCORE: 22

## 2024-07-19 ASSESSMENT — PAIN SCALES - GENERAL
PAINLEVEL_OUTOF10: 0 - NO PAIN

## 2024-07-19 ASSESSMENT — ACTIVITIES OF DAILY LIVING (ADL): LACK_OF_TRANSPORTATION: NO

## 2024-07-19 NOTE — NURSING NOTE
Patient improved well and was discharged this shift. IV antibiotics were switched to PO. Discharge instructions reviewed and questions answered. Patients wife apart of discharge teaching. Patient clothed and assisted by wheelchair to his car. Verbal and written discharge instructions given

## 2024-07-19 NOTE — ED NOTES
Patient request to remove his pulse ox to eat breakfast, he is eating pancakes with syrup. Pulse ox silenced on the monitor for patients comfort. Nurse will reattach monitor when patient is done eating.

## 2024-07-19 NOTE — PROGRESS NOTES
07/19/24 0950   Discharge Planning   Living Arrangements Spouse/significant other   Support Systems Spouse/significant other   Assistance Needed A&OX3; independent with ADLs with no DME; drives; room air baseline and currently room air   Type of Residence Private residence  (2 story house)   Number of Stairs to Enter Residence 3   Number of Stairs Within Residence 12   Do you have animals or pets at home? No   Home or Post Acute Services None   Expected Discharge Disposition Home  (This TCC offsite. Spoke with patient and he denies home going needs other than outpatient PT. Doc aware to write script for outpatient therapy. RN on floor aware of dc and wife is coming to transport patient home)   Financial Resource Strain   How hard is it for you to pay for the very basics like food, housing, medical care, and heating? Not hard   Housing Stability   In the last 12 months, was there a time when you were not able to pay the mortgage or rent on time? N   In the past 12 months, how many times have you moved where you were living? 0   At any time in the past 12 months, were you homeless or living in a shelter (including now)? N   Transportation Needs   In the past 12 months, has lack of transportation kept you from medical appointments or from getting medications? no   In the past 12 months, has lack of transportation kept you from meetings, work, or from getting things needed for daily living? No

## 2024-07-19 NOTE — CARE PLAN
Problem: Infection prevention/bleeding  Goal: Infection s/sx managed  Outcome: Progressing  Goal: No further progression of infection  Outcome: Progressing  Goal: No signs of bleeding  Outcome: Progressing  Goal: Normal coagulation studies  Outcome: Progressing     Problem: Perfusion/Cardiac  Goal: Adequate perfusion to organs/extremities  Outcome: Progressing  Goal: Hemodynamically stable  Outcome: Progressing  Goal: No cardiac arrhythmias  Outcome: Progressing     Problem: Respiratory/Oxygenation  Goal: No signs of respiratory compromise  Outcome: Progressing  Goal: Tolerates activity without increased O2 demands  Outcome: Progressing     Problem: Neuro/Coping  Goal: Minimal anxiety; utilize coping mechanisms  Outcome: Progressing  Goal: No signs of neurological compromise  Outcome: Progressing  Goal: Increase self care/family involvement  Outcome: Progressing     Problem: Fluid/Electrolyte/Nutrition  Goal: Fluid balance within 1 liter of normovolemia  Outcome: Progressing  Goal: No signs of renal failure  Outcome: Progressing  Goal: Normal electrolyte levels  Outcome: Progressing  Goal: Normal glucose levels  Outcome: Progressing  Goal: Tolerates nutritional intake  Outcome: Progressing     Problem: Respiratory  Goal: Clear secretions with interventions this shift  Outcome: Progressing  Goal: Minimize anxiety/maximize coping throughout shift  Outcome: Progressing  Goal: Minimal/no exertional discomfort or dyspnea this shift  Outcome: Progressing  Goal: Wean oxygen to maintain O2 saturation per order/standard this shift  Outcome: Progressing  Goal: Increase self care and/or family involvement in next 24 hours  Outcome: Progressing     Problem: Nutrition  Goal: Oral intake greater 75%  Outcome: Progressing  Goal: Consume prescribed supplement  Outcome: Progressing  Goal: Adequate PO fluid intake  Outcome: Progressing  Goal: Gradual weight gain  Outcome: Progressing  Goal: BG  mg/dL  Outcome:  Progressing  Goal: Lab values WNL  Outcome: Progressing  Goal: Electrolytes WNL  Outcome: Progressing  Goal: Maintain stable weight  Outcome: Progressing     Problem: Pain - Adult  Goal: Verbalizes/displays adequate comfort level or baseline comfort level  Outcome: Progressing     Problem: Safety - Adult  Goal: Free from fall injury  Outcome: Progressing     Problem: Discharge Planning  Goal: Discharge to home or other facility with appropriate resources  Outcome: Progressing     Problem: Chronic Conditions and Co-morbidities  Goal: Patient's chronic conditions and co-morbidity symptoms are monitored and maintained or improved  Outcome: Progressing     Problem: Skin  Goal: Participates in plan/prevention/treatment measures  Outcome: Progressing  Goal: Prevent/manage excess moisture  Outcome: Progressing  Goal: Prevent/minimize sheer/friction injuries  Outcome: Progressing  Goal: Promote/optimize nutrition  Outcome: Progressing     Problem: Fall/Injury  Goal: Not fall by end of shift  Outcome: Progressing  Goal: Be free from injury by end of the shift  Outcome: Progressing  Goal: Verbalize understanding of personal risk factors for fall in the hospital  Outcome: Progressing  Goal: Verbalize understanding of risk factor reduction measures to prevent injury from fall in the home  Outcome: Progressing  Goal: Pace activities to prevent fatigue by end of the shift  Outcome: Progressing     Problem: Heart Failure  Goal: Improved gas exchange this shift  Outcome: Progressing  Goal: Improved urinary output this shift  Outcome: Progressing  Goal: Reduction in peripheral edema within 24 hours  Outcome: Progressing  Goal: Report improvement of dyspnea/breathlessness this shift  Outcome: Progressing  Goal: Weight from fluid excess reduced over 2-3 days, then stabilize  Outcome: Progressing  Goal: Increase self care and/or family involvement in 24 hours  Outcome: Progressing   The clinical goals for the shift include maintain o2  sats, control coughing, safety

## 2024-07-19 NOTE — CARE PLAN
The patient's goals for the shift include Eat dinner.    The clinical goals for the shift include maintain o2 sats, control coughing, safety      Problem: Infection prevention/bleeding  Goal: Infection s/sx managed  Outcome: Progressing  Goal: No further progression of infection  Outcome: Progressing  Goal: No signs of bleeding  Outcome: Progressing  Goal: Normal coagulation studies  Outcome: Progressing     Problem: Perfusion/Cardiac  Goal: Adequate perfusion to organs/extremities  Outcome: Progressing  Goal: Hemodynamically stable  Outcome: Progressing  Goal: No cardiac arrhythmias  Outcome: Progressing     Problem: Respiratory/Oxygenation  Goal: No signs of respiratory compromise  Outcome: Progressing  Goal: Tolerates activity without increased O2 demands  Outcome: Progressing     Problem: Neuro/Coping  Goal: Minimal anxiety; utilize coping mechanisms  Outcome: Progressing  Goal: No signs of neurological compromise  Outcome: Progressing  Goal: Increase self care/family involvement  Outcome: Progressing     Problem: Fluid/Electrolyte/Nutrition  Goal: Fluid balance within 1 liter of normovolemia  Outcome: Progressing  Goal: No signs of renal failure  Outcome: Progressing  Goal: Normal electrolyte levels  Outcome: Progressing  Goal: Normal glucose levels  Outcome: Progressing  Goal: Tolerates nutritional intake  Outcome: Progressing     Problem: Respiratory  Goal: Clear secretions with interventions this shift  Outcome: Progressing  Goal: Minimize anxiety/maximize coping throughout shift  Outcome: Progressing  Goal: Minimal/no exertional discomfort or dyspnea this shift  Outcome: Progressing  Goal: Wean oxygen to maintain O2 saturation per order/standard this shift  Outcome: Progressing  Goal: Increase self care and/or family involvement in next 24 hours  Outcome: Progressing     Problem: Nutrition  Goal: Oral intake greater 75%  Outcome: Progressing  Goal: Consume prescribed supplement  Outcome: Progressing  Goal:  Adequate PO fluid intake  Outcome: Progressing  Goal: Gradual weight gain  Outcome: Progressing  Goal: BG  mg/dL  Outcome: Progressing  Goal: Lab values WNL  Outcome: Progressing  Goal: Electrolytes WNL  Outcome: Progressing  Goal: Maintain stable weight  Outcome: Progressing     Problem: Pain - Adult  Goal: Verbalizes/displays adequate comfort level or baseline comfort level  Outcome: Progressing     Problem: Safety - Adult  Goal: Free from fall injury  Outcome: Progressing     Problem: Discharge Planning  Goal: Discharge to home or other facility with appropriate resources  Outcome: Progressing     Problem: Chronic Conditions and Co-morbidities  Goal: Patient's chronic conditions and co-morbidity symptoms are monitored and maintained or improved  Outcome: Progressing     Problem: Skin  Goal: Participates in plan/prevention/treatment measures  Outcome: Progressing  Goal: Prevent/manage excess moisture  Outcome: Progressing  Goal: Prevent/minimize sheer/friction injuries  Outcome: Progressing  Goal: Promote/optimize nutrition  Outcome: Progressing     Problem: Fall/Injury  Goal: Not fall by end of shift  Outcome: Progressing  Goal: Be free from injury by end of the shift  Outcome: Progressing  Goal: Verbalize understanding of personal risk factors for fall in the hospital  Outcome: Progressing  Goal: Verbalize understanding of risk factor reduction measures to prevent injury from fall in the home  Outcome: Progressing  Goal: Pace activities to prevent fatigue by end of the shift  Outcome: Progressing

## 2024-07-19 NOTE — PROGRESS NOTES
Physical Therapy    Physical Therapy Treatment    Patient Name: Terrance Armijo  MRN: [id number]  Today's Date: 7/19/2024  Time Calculation  Start Time: 0840  Stop Time: 0904  Time Calculation (min): 24 min    Assessment/Plan   PT Assessment  PT Assessment Results: Decreased endurance, Decreased mobility  Rehab Prognosis: Good  Barriers to Discharge: none  End of Session Communication: Bedside nurse  Assessment Comment: Pleasant 84 y.o presents with weakness. Pt. lives with spouse and is normally IND. Pt. currently requires close supervision with SC and would benefit from additional PT to address above noted limitations and prevent further decline.  End of Session Patient Position: Up in chair, Alarm on  PT Plan  Inpatient/Swing Bed or Outpatient: Inpatient  PT Plan  Treatment/Interventions: Transfer training, Gait training, Stair training, Therapeutic exercise  PT Plan: Ongoing PT  PT Frequency: 3 times per week  PT Discharge Recommendations: Low intensity level of continued care  Equipment Recommended upon Discharge: Straight cane  PT Recommended Transfer Status: Stand by assist  PT - OK to Discharge: Yes    General Visit Information:   PT  Visit  PT Received On: 07/19/24  General  Prior to Session Communication: Bedside nurse  Patient Position Received: Alarm on, Up in chair    Subjective   Patient pleasant and agreeable to session.  Precautions:  Precautions  Medical Precautions: Fall precautions  Vital Signs:  Vital Signs  SpO2: 95 %    Objective   Pain:  Pain Assessment  Pain Assessment: 0-10  0-10 (Numeric) Pain Score: 0 - No pain  Cognition:  Cognition  Overall Cognitive Status: Within Functional Limits  Coordination:  Movements are Fluid and Coordinated: Yes     Treatments:  Therapeutic Exercise  Therapeutic Exercise Performed: Yes  Therapeutic Exercise Activity 1: laqs x 20 with vcs required to slow pace  Therapeutic Exercise Activity 2: hip flexion seated x 20 with vcs required to use full available  ROM  Therapeutic Exercise Activity 3: iso hip abduction/adduction x 20  Therapeutic Exercise Activity 4: toe/heel raises x 20    Ambulation/Gait Training  Ambulation/Gait Training Performed: Yes  Ambulation/Gait Training 1  Surface 1: Level tile  Device 1: No device  Gait Support Devices: Gait belt  Assistance 1: Close supervision  Quality of Gait 1: Antalgic  Comments/Distance (ft) 1: 50,150  Transfers  Transfer: Yes  Transfer 1  Technique 1: Sit to stand, Stand to sit  Transfer Device 1: Gait belt  Transfer Level of Assistance 1: Close supervision  Patient declined use of cane.    Stairs  Stairs: Yes  Stairs  Rails 1: Right  Device 1: Railing  Support Devices 1: Gait belt  Assistance 1: Close supervision  Comment/Number of Steps 1: 5x2    Outcome Measures:  [facility] Basic Mobility  Turning from your back to your side while in a flat bed without using bedrails: None  Moving from lying on your back to sitting on the side of a flat bed without using bedrails: None  Moving to and from bed to chair (including a wheelchair): A little  Standing up from a chair using your arms (e.g. wheelchair or bedside chair): None  To walk in hospital room: None  Climbing 3-5 steps with railing: A little  Basic Mobility - Total Score: 22    Education Documentation  Mobility Training, taught by Stephanie Waddell PTA at 7/19/2024 10:02 AM.  Learner: Patient  Readiness: Acceptance  Method: Explanation  Response: Demonstrated Understanding  Comment: Educated patient on proper negotiation of stairs with one rail, good follow through with cues.    Education Comments  No comments found.      Encounter Problems       Encounter Problems (Active)       PT Transfers       STG - Patient will transfer sit to and from stand IND (Progressing)       Start:  07/18/24    Expected End:  08/01/24               Pain - Adult             Encounter Problems (Resolved)       Balance       STG - Maintains dynamic standing balance with upper extremity support  with normal balance  (Met)       Start:  07/18/24    Expected End:  08/01/24    Resolved:  07/19/24            Mobility       LTG - Patient will ambulate community distance ERENDIRA with cane  (Met)       Start:  07/18/24    Expected End:  08/01/24    Resolved:  07/19/24         STG - Patient will ascend and descend a flight of stairs with 1 rail and cane (Met)       Start:  07/18/24    Expected End:  08/01/24    Resolved:  07/19/24

## 2024-07-20 NOTE — DISCHARGE SUMMARY
Discharge Diagnosis  Sepsis with acute hypoxic respiratory failure without septic shock, due to unspecified organism (Multi)    Discharge Meds     Your medication list        START taking these medications        Instructions Last Dose Given Next Dose Due   azithromycin 500 mg tablet  Commonly known as: Zithromax      Take 1 tablet (500 mg) by mouth once daily for 2 days.       cefuroxime 500 mg tablet  Commonly known as: Ceftin      Take 1 tablet (500 mg) by mouth 2 times a day for 7 days.              CONTINUE taking these medications        Instructions Last Dose Given Next Dose Due   aspirin 81 mg EC tablet           benzonatate 100 mg capsule  Commonly known as: Tessalon      Take 1 capsule (100 mg) by mouth 3 times a day as needed for cough. Do not crush or chew.       carvedilol 12.5 mg tablet  Commonly known as: Coreg           difluprednate 0.05 % ophthalmic solution  Commonly known as: Durezol           digoxin 125 MCG tablet  Commonly known as: Lanoxin           multivitamin tablet           omeprazole 20 mg DR capsule  Commonly known as: PriLOSEC           Repatha SureClick 140 mg/mL injection  Generic drug: evolocumab           ruxolitinib 1.5 % cream cream  Commonly known as: Opzelura      Apply 1 Application topically 2 times a day.       spironolactone 50 mg tablet  Commonly known as: Aldactone           tacrolimus 0.1 % ointment  Commonly known as: Protopic           vitamin E 180 mg (400 unit) capsule                  STOP taking these medications      amoxicillin-pot clavulanate 875-125 mg tablet  Commonly known as: Augmentin                  Where to Get Your Medications        These medications were sent to [facility]/pharmacy #[id number] [place], [place] - [address] [address]      Phone: [phone]   azithromycin 500 mg tablet  cefuroxime 500 mg tablet         Test Results Pending At Discharge  Pending Labs       Order Current Status    Blood Culture Preliminary result    Blood  Culture Preliminary result            Hospital Course   Terrance Armijo is a 84 y.o. male with PMH of pacemaker, GERD, HTN who was admitted with pneumonia and sepsis after presenting with fever, cough, shortness of breath, and generalized weakness for 5 days. CXR showed RLL opacity. He was treated with IV antibiotics, oxygen supplementation and supportive adjuncts. He was also treated for hyponatremia with IVF. He was discharged home with continuation of oral antibiotics.     Active Problem List:  #Sepsis with acute hypoxic respiratory failure without septic shock, due to unspecified organism (Multi)  #Pneumonia of right lower lobe due to infectious organism  #Weakness  -WBC 16.3  -BC x 2 Ordered  -Covid Negative  -Lactate 1.0  -Procalcitonin Ordered  -Respiratory Culture Ordered  -CXR:Ill-defined opacity within the right lower lobe with underlying stranding probably representing an evolving pneumonia.  Short-term interval follow-up suggested.  -Mucinex 600 mg PO BID   -Zithromax 500 mg IV x1 given in ED   -Zosyn 3.375 g IV Q 6 hours (Day 1)  -[facility] to Dose (Day 1)  -Home O2 RA  -Currently on RA  ---DC with oral azithromycin, cefuroxime  ---Follow up as needed with PCP  ---Outpatient PT     Chronic Problem List:  #Benign hypertension  #Cardiac defibrillator in place  #Congestive heart failure (Multi)  #Nonischemic cardiomyopathy (Multi)  #AGNIESZKA  #CKD 3A  #Gastroesophageal reflux disease  #Retinal detachment   #Acute iritis  #Psoriasis  ---Continue all chronic meds  ---Follow up as needed with PCP    Pertinent Physical Exam At Time of Discharge  Physical Exam  Vitals reviewed.   Constitutional:       Appearance: He is normal weight. He is ill-appearing.   HENT:      Head: Normocephalic and atraumatic.      Right Ear: External ear normal.      Left Ear: External ear normal.      Nose: Nose normal.      Mouth/Throat:      Mouth: Mucous membranes are moist.      Pharynx: Oropharynx is clear.   Eyes:       Conjunctiva/sclera: Conjunctivae normal.      Pupils: Pupils are equal, round, and reactive to light.   Cardiovascular:      Rate and Rhythm: Normal rate and regular rhythm.      Pulses: Normal pulses.      Heart sounds: Normal heart sounds.   Pulmonary:      Effort: Pulmonary effort is normal.      Breath sounds: Rhonchi present.   Abdominal:      General: Bowel sounds are normal.      Palpations: Abdomen is soft.   Musculoskeletal:         General: Normal range of motion.      Cervical back: Normal range of motion and neck supple.   Skin:     General: Skin is warm and dry.   Neurological:      General: No focal deficit present.      Mental Status: He is alert and oriented to person, place, and time.   Psychiatric:         Mood and Affect: Mood normal.         Behavior: Behavior normal.      Stable for discharge.  Total cumulative time spent in preparation of this discharge including documentation review, coordination of care with the medical team including PT/SW/care coordinators and treating consultants, discussion with patient and pertinent family members and finalization of prescriptions, follow-up appointments, and this discharge summary was approximately 45 minutes.    Outpatient Follow-Up  Future Appointments   Date Time Provider Department Center   7/23/2024  1:30 PM POLLY PINO4 [facility] 1 [id number]   10/15/2024  1:15 PM Jose Sanchez MD [id number]   1/10/2025  9:00 AM Isaura Chacko MD [id number]         LONNIE Mojica-CNP

## 2024-07-21 LAB
BACTERIA BLD CULT: NORMAL
BACTERIA BLD CULT: NORMAL

## 2024-07-22 ENCOUNTER — PATIENT OUTREACH (OUTPATIENT)
Dept: PRIMARY CARE | Facility: CLINIC | Age: 84
End: 2024-07-22
Payer: MEDICARE

## 2024-07-22 NOTE — PROGRESS NOTES
Discharge Facility: [facility]  Discharge Diagnosis: Sepsis with acute hypoxic respiratory failure without septic shock, due to unspecified organism     Admission Date: 7-  Discharge Date:  7-    PCP Appointment Date: 7-  Specialist Appointment Date: NA  Hospital Encounter and Summary Linked: Yes  See discharge assessment below for further details.  Medications  Medications reviewed with patient/caregiver?: Yes (7/22/2024 11:40 AM)  Is the patient having any side effects they believe may be caused by any medication additions or changes?: No (7/22/2024 11:40 AM)  Does the patient have all medications ordered at discharge?: Yes (7/22/2024 11:40 AM)  Care Management Interventions: Provided patient education (7/22/2024 11:40 AM)  Prescription Comments: START taking:  azithromycin (Zithromax)  / cefuroxime (Ceftin) /// STOP taking:  amoxicillin-pot clavulanate 875-125 mg  tablet (Augmentin) (7/22/2024 11:40 AM)  Is the patient taking all medications as directed (includes completed medication regime)?: Yes (7/22/2024 11:40 AM)  Care Management Interventions: Provided patient education (7/22/2024 11:40 AM)  Medication Comments: PATIENT DID NOT REALIZE HE WAS SUPPOSED TO TAKE CEFTIN 2 TIMES DAILY- HE WILL START TODAY. (7/22/2024 11:40 AM)    Appointments  Does the patient have a primary care provider?: Yes (7/22/2024 11:40 AM)  Care Management Interventions: Verified appointment date/time/provider (7/22/2024 11:40 AM)  Has the patient kept scheduled appointments due by today?: Yes (7/22/2024 11:40 AM)    Self Management  What is the home health agency?: NA  NO IDENTIFIED NEEDS AT TIME OF THIS CONVERSATION (7/22/2024 11:40 AM)  Has home health visited the patient within 72 hours of discharge?: Not applicable (7/22/2024 11:40 AM)  What Durable Medical Equipment (DME) was ordered?: NA (7/22/2024 11:40 AM)    Patient Teaching  Does the patient have access to their discharge  instructions?: Yes (2024 11:40 AM)  Care Management Interventions: Reviewed instructions with patient (2024 11:40 AM)  What is the patient's perception of their health status since discharge?: Improving (2024 11:40 AM)  Is the patient/caregiver able to teach back the hierarchy of who to call/visit for symptoms/problems? PCP, Specialist, Home Health nurse, Urgent Care, ED, 911: Yes (2024 11:40 AM)  Patient/Caregiver Education Comments: DENIES FEVERS, CHILLS, SOB OR CHEST PAIN. REPORTS DOING WELL AT HOME. WILL ATTEND OUTPATIENT PT SCHEDULED FOR NEXT WEEK. HAS FU WITH PCP TOMORROW AFTERNOON. CONFIRMS HE WILL ATTEND. (2024 11:40 AM)    Wrap Up  Wrap Up Additional Comments: Spoke w/ pt. Pt identified by name and .                                                                                                                                                      Reviewed discharge instructions, medications, and follow up. Patient denies any further discharge questions/needs at this time.Please take all medications as prescribed and keep all follow-up appointments. Emphasized the importance of hospital follow up.                                                                                                                                                                                                             Contact your primary care physician with any questions or concerns that arise.Aware of my availability for non emergent concerns (2024 11:40 AM)

## 2024-07-23 ENCOUNTER — OFFICE VISIT (OUTPATIENT)
Dept: PRIMARY CARE | Facility: CLINIC | Age: 84
End: 2024-07-23
Payer: MEDICARE

## 2024-07-23 ENCOUNTER — APPOINTMENT (OUTPATIENT)
Dept: PRIMARY CARE | Facility: CLINIC | Age: 84
End: 2024-07-23
Payer: MEDICARE

## 2024-07-23 VITALS
BODY MASS INDEX: 24.22 KG/M2 | OXYGEN SATURATION: 96 % | SYSTOLIC BLOOD PRESSURE: 119 MMHG | DIASTOLIC BLOOD PRESSURE: 74 MMHG | WEIGHT: 145.4 LBS | HEIGHT: 65 IN | HEART RATE: 70 BPM | TEMPERATURE: 97 F

## 2024-07-23 DIAGNOSIS — J18.9 COMMUNITY ACQUIRED PNEUMONIA OF RIGHT LOWER LOBE OF LUNG: Primary | ICD-10-CM

## 2024-07-23 PROCEDURE — 1157F ADVNC CARE PLAN IN RCRD: CPT | Performed by: FAMILY MEDICINE

## 2024-07-23 PROCEDURE — 1159F MED LIST DOCD IN RCRD: CPT | Performed by: FAMILY MEDICINE

## 2024-07-23 PROCEDURE — 3074F SYST BP LT 130 MM HG: CPT | Performed by: FAMILY MEDICINE

## 2024-07-23 PROCEDURE — 3078F DIAST BP <80 MM HG: CPT | Performed by: FAMILY MEDICINE

## 2024-07-23 PROCEDURE — 99495 TRANSJ CARE MGMT MOD F2F 14D: CPT | Performed by: FAMILY MEDICINE

## 2024-07-23 PROCEDURE — 1111F DSCHRG MED/CURRENT MED MERGE: CPT | Performed by: FAMILY MEDICINE

## 2024-07-23 ASSESSMENT — ENCOUNTER SYMPTOMS
OCCASIONAL FEELINGS OF UNSTEADINESS: 0
LOSS OF SENSATION IN FEET: 0
DEPRESSION: 0

## 2024-07-23 NOTE — PROGRESS NOTES
"Subjective   Patient ID: Terrance Armijo is a 84 y.o. male who presents for Follow-up (Hospital for pneumonia and dehydration for  3 days/Patient states no concerns today, just on the road to recovery ).    HPI  Here for follow up of hospitalization 7/17-7/19 for sepsis with acute hypoxic respiratory failure secondary to pneumonia. Sent home on Azithromycin and cefuroxime. Still coughing but feeling better. No fever. No sob. Just fatigued.     Review of Systems  General: no fever  Eyes: no blurry vision  ENT: no sore throat, no ear pain  Resp:see HPI  Cardio: no chest pain, no palpitations  Abd: no nausea/vomiting  : no dysuria, no increased urinary frequency      /74   Pulse 70   Temp 36.1 °C (97 °F)   Ht 1.651 m (5' 5\")   Wt 66 kg (145 lb 6.4 oz)   SpO2 96%   BMI 24.20 kg/m²       Objective   Physical Exam  Gen: NAD, alert  Head: normocephalic/atraumatic  Eyes: conjunctivae normal  Ears: canals clear bilaterally, TM normal   Nose: external nose normal   Oropharynx: clear   Resp: Clear to auscultation  CVS: Regular rate and rhythm  Abdomen: soft, NT, ND  Ext: no edema, NT of lower extremities  Neuro: gait normal     Assessment/Plan   Problem List Items Addressed This Visit    None  Visit Diagnoses       Community acquired pneumonia of right lower lobe of lung    -  Primary  Continue antibiotics till finished               "

## 2024-07-26 ENCOUNTER — PATIENT OUTREACH (OUTPATIENT)
Dept: PRIMARY CARE | Facility: CLINIC | Age: 84
End: 2024-07-26
Payer: MEDICARE

## 2024-07-26 NOTE — PROGRESS NOTES
Unable to reach patient for call back after patient's follow up appointment with PCP. (7-)  LVM with call back number for patient to call if needed.

## 2024-07-29 ENCOUNTER — EVALUATION (OUTPATIENT)
Dept: PHYSICAL THERAPY | Facility: HOSPITAL | Age: 84
End: 2024-07-29
Payer: MEDICARE

## 2024-07-29 DIAGNOSIS — R53.1 WEAKNESS: Primary | ICD-10-CM

## 2024-07-29 PROCEDURE — 97110 THERAPEUTIC EXERCISES: CPT | Mod: GP | Performed by: PHYSICAL THERAPIST

## 2024-07-29 PROCEDURE — 97161 PT EVAL LOW COMPLEX 20 MIN: CPT | Mod: GP | Performed by: PHYSICAL THERAPIST

## 2024-07-29 ASSESSMENT — ENCOUNTER SYMPTOMS
LOSS OF SENSATION IN FEET: 0
DEPRESSION: 0
OCCASIONAL FEELINGS OF UNSTEADINESS: 0

## 2024-07-29 ASSESSMENT — PAIN SCALES - GENERAL: PAINLEVEL_OUTOF10: 0 - NO PAIN

## 2024-07-29 ASSESSMENT — COLUMBIA-SUICIDE SEVERITY RATING SCALE - C-SSRS
6. HAVE YOU EVER DONE ANYTHING, STARTED TO DO ANYTHING, OR PREPARED TO DO ANYTHING TO END YOUR LIFE?: NO
1. IN THE PAST MONTH, HAVE YOU WISHED YOU WERE DEAD OR WISHED YOU COULD GO TO SLEEP AND NOT WAKE UP?: NO
2. HAVE YOU ACTUALLY HAD ANY THOUGHTS OF KILLING YOURSELF?: NO

## 2024-07-29 ASSESSMENT — PAIN - FUNCTIONAL ASSESSMENT: PAIN_FUNCTIONAL_ASSESSMENT: 0-10

## 2024-07-29 NOTE — PROGRESS NOTES
"  Physical Therapy  Physical Therapy Evaluation and Treatment    Patient Name: Terrance Armijo  MRN: [id number]  Today's Date: 7/29/2024  Time Calculation  Start Time: 0843  Stop Time: 0928  Time Calculation (min): 45 min    Today's Charges  PT Evaluation Time Entry  PT Evaluation (Low) Time Entry: 38  PT Therapeutic Procedures Time Entry  Therapeutic Exercise Time Entry: 7    Insurance:  Visit number: 1 of 5  Authorization info: auth required  Payor: [employer] / Plan: [employer]ARE [employer]VANTAGE / Product Type: *No Product type* /     Current Problem  1. Weakness  Referral to Physical Therapy        Problem List Items Addressed This Visit             ICD-10-CM    Weakness - Primary R53.1       General:  General  Reason for Referral: weakness  Referred By: Laura LEWIS      Precautions:   Precautions  STEADI Fall Risk Score (The score of 4 or more indicates an increased risk of falling): 1  Medical Precautions: No known precautions/limitation    Medical History Form: Reviewed (scanned into chart)    Subjective:   Subjective   Chief Complaint: Patient is an 84 year old male who presents to clinic with weakness following a recent hospitalization. Patient was recently hospitalized, \"7/17-7/19 for sepsis with acute hypoxic respiratory failure secondary to pneumonia\".    Onset Date: 7/17/2024  SMITA: Insidious    Current Condition:   Better    Pain:  Pain Assessment: 0-10  0-10 (Numeric) Pain Score: 0 - No pain    Relevant Information (PMH & Previous Tests/Imaging):   Chest xray 7/17/2024:  Ill-defined opacity within the right lower lobe with underlying stranding probably representing an evolving pneumonia.  Short-term interval follow-up suggested.    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 Patient is able to perform stairs without difficulty.  Prior Function Per Pt/Caregiver Report  Level of [place]: Independent with ADLs and functional transfers, Independent with homemaking with ambulation  Primary Language: English    Patient's Goal(s) for Therapy: Get stronger so he can do projects around the house.    Red Flags: Do you have any of the following? No  Fever/chills, unexplained weight changes, dizziness/fainting, unexplained change in bowel or bladder functions, unexplained malaise or muscle weakness, night pain/sweats, numbness or tingling    Objective:  Objective     Hip AROM  R hip flexion: (125°): WFL  L hip flexion: (125°): WFL  R hip abduction: (45°): min restriction  L hip abduction: (45°): min restriction  R hip ER: (45°): WFL  L hip ER: (45°): min restriction  R hip IR: (45°): WFL  L hip IR: (45°): min restriction    Hip PROM  R hip flexion: (125°): WFl  L hip flexion: (125°): WFL  R hip abduction: (45°): min restriction  L hip abduction: (45°): min restriction  R hip ER: (45°): WFL  L hip ER: (45°): min restriction  R hip IR: (45°): WFL  L hip IR: (45°): min restriction    Specific Lower Extremity MMT  R Iliopsoas: (5/5): 4/5  L Iliopsoas: (5/5): 4/5  R Gluteals (sidelying): (5/5): 4/5  L Gluteals (sidelying): (5/5): 4/5    Flexibility  R hamstrings: min restriction  L hamstrings: min restriction    Knee AROM  Knee AROM WFL: yes    Knee MMT  R knee flexion: (5/5): 4+/5  L knee flexion: (5/5): 4+/5  R knee extension: (5/5): 4+/5  L knee extension: (5/5): 4+/5    Ankle MMT  R ankle dorsiflexion: (5/5): 5/5  L ankle dorsiflexion: (5/5): 5/5  R ankle plantarflexion: (5/5): 4+/5  L ankle plantarflexion: (5/5): 4+/5    Posture: shoulder rounded forward and head forward    Lower Extremity Functional Movements  Transfers:  "Independent  Gait: none  Assistive Device no device  SL Balance: not tested   DL Squat: no upper extremity support  SL Squat: not tested   Feet Together: 30 seconds  Tandem: <5 seconds with each leg in front  SLS: unable, not safe    Outcome Measures:  Other Measures  5x Sit to Stand: 14.80  Other Outcome Measures: AM-PAC short form 67   Tinetti: 26    Treatment Performed:   Therapeutic Exercise  Therapeutic Exercise Performed: Yes  Therapeutic Exercise Activity 1: bridge x5  Therapeutic Exercise Activity 2: piriformis stretch 2x20\"  Therapeutic Exercise Activity 3: hamstring stretch 2x20\"  Therapeutic Exercise Activity 4: standing hip abd x5  Therapeutic Exercise Activity 5: standing hip ext x5    EDUCATION:   Individual(s) Educated: patient   Education Provided: Home exercise program, plan of care, activity modifications, pain management, and injury pathology  Handout(s) Provided: Scanned into chart  Home Program: Access Code: [email or web address]  Risk and Benefits Discussed with Patient/Caregiver/Other: Yes   Patient/Caregiver Demonstrated Understanding: Yes   Plan of Care Discussed and Agreed Upon: Yes   Patient Response to Education: Patient/Caregiver verbalized understanding of information and Patient/Caregiver performed return demonstration of exercises/activities    Assessment: Patient presents with impaired balance and strength resulting in limited participation in pain-free ADLs and inability to perform at their prior level of function. Patient demonstrated difficulty performing tandem stance. Patient demonstrated a tendency to hip hike during standing hip abduction. Patient was able to correct with verbal and tactile cues. Patient would benefit from physical therapy to address the impairments found & listed previously in the objective section in order to return to safe and pain-free ADLs and prior level of function.  PT Assessment Results: Decreased strength, Impaired balance  Rehab Prognosis: " Good  Evaluation/Treatment Tolerance: Patient tolerated treatment well    Complexity: low    Plan:  Treatment/Interventions: Education/ Instruction, Gait training, Neuromuscular re-education, Manual therapy, Therapeutic activities, Therapeutic exercises  PT Plan: Skilled PT  PT Frequency: 1 time per week  Duration: 5 weeks  Onset Date: 07/19/24  Certification Period Start Date: 07/29/24  Certification Period End Date: 09/02/24  Number of Treatments Authorized: 1 of 1  Rehab Potential: Good  Plan of Care Agreement: Patient  Planned Interventions include: therapeutic exercise, self-care home management, manual therapy, therapeutic activities, gait training, neuromuscular coordination, vasopneumatic, dry needling, aquatic therapy    Goals: Set and discussed today  Active       PT Problem       Patient will demonstrate improved hamstring flexibility in bilateral lower extremities WFL.       Start:  07/29/24    Expected End:  09/02/24            Patient will be able to perform 5x Sit to  10 seconds or less.       Start:  07/29/24    Expected End:  09/02/24            Patient will improve his score on the Tinetti Tool to 18 or less.       Start:  07/29/24    Expected End:  09/02/24            Patient will maintain tandem stance for at least 20 sec with no upper extremity support.       Start:  07/29/24    Expected End:  09/02/24            Patient will achieve bilateral hip flexion strength of at least 4+/5       Start:  07/29/24    Expected End:  09/02/24            Patient will achieve bilateral hip abduction strength of at least 4+/5       Start:  07/29/24    Expected End:  09/02/24            Patient will demonstrate independence in home program for support of progression       Start:  07/29/24    Expected End:  08/12/24            Patient will show a significant change in AM-PAC short form (67 to 77) patient reported outcome tool to demonstrate subjective imporovement       Start:  07/29/24    Expected End:   09/02/24                Plan of care was developed with input and agreement by the patient          Hadley Washington, PT

## 2024-08-06 ENCOUNTER — TREATMENT (OUTPATIENT)
Dept: PHYSICAL THERAPY | Facility: HOSPITAL | Age: 84
End: 2024-08-06
Payer: MEDICARE

## 2024-08-06 DIAGNOSIS — R53.1 WEAKNESS: ICD-10-CM

## 2024-08-06 PROCEDURE — 97112 NEUROMUSCULAR REEDUCATION: CPT | Mod: GP,CQ

## 2024-08-06 PROCEDURE — 97110 THERAPEUTIC EXERCISES: CPT | Mod: GP,CQ

## 2024-08-06 ASSESSMENT — PAIN - FUNCTIONAL ASSESSMENT: PAIN_FUNCTIONAL_ASSESSMENT: 0-10

## 2024-08-06 ASSESSMENT — PAIN SCALES - GENERAL: PAINLEVEL_OUTOF10: 0 - NO PAIN

## 2024-08-06 NOTE — PROGRESS NOTES
"  Physical Therapy Treatment    Patient Name: Terrance Armijo  MRN: [id number]  Today's Date: 8/6/2024  Time Calculation  Start Time: 0827  Stop Time: 0910  Time Calculation (min): 43 min        PT Therapeutic Procedures Time Entry  Neuromuscular Re-Education Time Entry: 18  Therapeutic Exercise Time Entry: 25                Current Problem  1. Weakness  Follow Up In Physical Therapy          General  Reason for Referral: weakness  Referred By: Laura LEWIS  Past Medical History Relevant to Rehab:  Basal cell carcinoma of skin of unspecified parts of face 12/24/2013    Basal cell carcinoma of skin of face   Dermatitis 02/02/2023   Essential (primary) hypertension 11/30/2022    Benign hypertension   Gastro-esophageal reflux disease without esophagitis 09/27/2016    Gastroesophageal reflux disease   Melanocytic nevi of trunk 10/12/2022   Neoplasm of uncertain behavior of skin 10/12/2022   Other conditions influencing health status 06/09/2017    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 States he is able to perform HEP without a problem.     Performing HEP?: Yes    Precautions  Precautions  Medical Precautions: No known precautions/limitation  Pain  Pain Assessment: 0-10  0-10 (Numeric) Pain Score: 0 - No pain    Objective           Treatments:    Therapeutic Exercise  Therapeutic Exercise Performed: Yes  Therapeutic Exercise Activity 1: Nu Step Level 4 Seat 7  Therapeutic Exercise Activity 2: Step ups 4 with opposite knee drive 4\" x10 R/L  Therapeutic Exercise Activity 3: STS with OH Press 4# x15  Therapeutic Exercise Activity 4: standing hip abd yellow x10 R/L  Therapeutic Exercise Activity 5: standing hip ext Yellow x10 R/L    Balance/Neuromuscular Re-Education  Balance/Neuromuscular Re-Education Activity Performed: Yes  Balance/Neuromuscular Re-Education " "Activity 1: Rocking 1' R/L  Balance/Neuromuscular Re-Education Activity 2: Airex head movements 30\" each  Balance/Neuromuscular Re-Education Activity 3: Airex EC WBOS and Romberg 30\" each  Balance/Neuromuscular Re-Education Activity 4: Tandem walk 6' x4  Balance/Neuromuscular Re-Education Activity 5: Lateral Step and Reach x10 R/L  Balance/Neuromuscular Re-Education Activity 6: Airex Chops 4# x15                EDUCATION:   Individual(s) Educated: Patient  Education Provided: T-band hip abd/ext  Risk and Benefits Discussed with Patient/Caregiver/Other: Yes   Patient/Caregiver Demonstrated Understanding: Yes   Patient Response to Education: Patient/Caregiver verbalized understanding of information and Patient/Caregiver performed return demonstration of exercises/activities    Assessment: Pt was able to maintain balance with EC on compliant surface with min/mod ankle strategy and no LOB. Pt was able to perform tandem walk without UE support for 2 of 4 reps. Pt required cuing and demonstration to perform exercises with correct technique and pace with good follow through demonstrated.   PT Assessment  PT Assessment Results: Decreased strength, Impaired balance  Rehab Prognosis: Good    Plan: Continue to progress current POC as tolerated to facilitate ability to perform functional activities.   OP PT Plan  PT Plan: Skilled PT  PT Frequency: 1 time per week  Duration: 5 weeks  Onset Date: 07/19/24  Certification Period Start Date: 07/29/24  Certification Period End Date: 09/02/24  Number of Treatments Authorized: Visit 2    Goals:  Active       PT Problem       Patient will demonstrate improved hamstring flexibility in bilateral lower extremities WFL.       Start:  07/29/24    Expected End:  09/02/24            Patient will be able to perform 5x Sit to  10 seconds or less.       Start:  07/29/24    Expected End:  09/02/24            Patient will improve his score on the Tinetti Tool to 18 or less.       Start:  " 07/29/24    Expected End:  09/02/24            Patient will maintain tandem stance for at least 20 sec with no upper extremity support.       Start:  07/29/24    Expected End:  09/02/24            Patient will achieve bilateral hip flexion strength of at least 4+/5       Start:  07/29/24    Expected End:  09/02/24            Patient will achieve bilateral hip abduction strength of at least 4+/5       Start:  07/29/24    Expected End:  09/02/24            Patient will demonstrate independence in home program for support of progression       Start:  07/29/24    Expected End:  08/12/24            Patient will show a significant change in AM-PAC short form (67 to 77) patient reported outcome tool to demonstrate subjective imporovement       Start:  07/29/24    Expected End:  09/02/24                 Vishal Hazel PTA

## 2024-08-13 ENCOUNTER — TREATMENT (OUTPATIENT)
Dept: PHYSICAL THERAPY | Facility: HOSPITAL | Age: 84
End: 2024-08-13
Payer: MEDICARE

## 2024-08-13 DIAGNOSIS — R53.1 WEAKNESS: ICD-10-CM

## 2024-08-13 PROCEDURE — 97112 NEUROMUSCULAR REEDUCATION: CPT | Mod: GP,CQ

## 2024-08-13 PROCEDURE — 97110 THERAPEUTIC EXERCISES: CPT | Mod: GP,CQ

## 2024-08-13 ASSESSMENT — PAIN - FUNCTIONAL ASSESSMENT: PAIN_FUNCTIONAL_ASSESSMENT: 0-10

## 2024-08-13 ASSESSMENT — PAIN SCALES - GENERAL: PAINLEVEL_OUTOF10: 0 - NO PAIN

## 2024-08-13 NOTE — PROGRESS NOTES
"  Physical Therapy Treatment    Patient Name: Terrance Armijo  MRN: [id number]  Today's Date: 8/13/2024  Time Calculation  Start Time: 0830  Stop Time: 0911  Time Calculation (min): 41 min        PT Therapeutic Procedures Time Entry  Neuromuscular Re-Education Time Entry: 16  Therapeutic Exercise Time Entry: 25                Current Problem  1. Weakness  Follow Up In Physical Therapy          General  Reason for Referral: weakness  Referred By: Laura LEWIS    Subjective   Current Condition:   Same  Patient reports he's been walking more at home for exercise.     Performing HEP?: Yes    Precautions  Precautions  Medical Precautions: No known precautions/limitation  Pain  Pain Assessment: 0-10  0-10 (Numeric) Pain Score: 0 - No pain    Objective           Treatments:    Therapeutic Exercise  Therapeutic Exercise Performed: Yes  Therapeutic Exercise Activity 1: Nu Step Level 4 Seat 7  Therapeutic Exercise Activity 2: Lateral Step Ups 4\" x10 R/L  Therapeutic Exercise Activity 3: STS with OH Press 4# x15  Therapeutic Exercise Activity 4: side step Yellow 6'x4  Therapeutic Exercise Activity 6: Hamstring Curls 35# x20 bilateral  Therapeutic Exercise Activity 7: Split Stance Rows Double Green x20    Balance/Neuromuscular Re-Education  Balance/Neuromuscular Re-Education Activity Performed: Yes  Balance/Neuromuscular Re-Education Activity 1: Step and Reach with head turns x10 R/L  Balance/Neuromuscular Re-Education Activity 2: Airex head movements with EC 30\" each  Balance/Neuromuscular Re-Education Activity 3: Airex EC WBOS and Romberg 30\" each  Balance/Neuromuscular Re-Education Activity 6: Airex Chops 4# x15                EDUCATION:   Individual(s) Educated: Patient  Education Provided: HEP   Risk and Benefits Discussed with Patient/Caregiver/Other: Yes   Patient/Caregiver Demonstrated Understanding: Yes   Patient Response to Education: Patient/Caregiver verbalized understanding of information and Patient/Caregiver " performed return demonstration of exercises/activities    Assessment: Pt required cuing to correct sliding his feet during side step with resistance, with good follow through demonstrated.  Pt was able to perform side step with resistance without UE support and no LOB. Pt challenged with step and reach with head turns, with two episodes with LOB, which required UE support to recover.   PT Assessment  PT Assessment Results: Decreased strength, Impaired balance  Rehab Prognosis: Good    Plan: Continue to progress current POC as tolerated to facilitate ability to perform functional activities.   OP PT Plan  PT Plan: Skilled PT  PT Frequency: 1 time per week  Duration: 5 weeks  Onset Date: 07/19/24  Certification Period Start Date: 07/29/24  Certification Period End Date: 09/02/24  Number of Treatments Authorized: Visit 3    Goals:  Active       PT Problem       Patient will demonstrate improved hamstring flexibility in bilateral lower extremities WFL.       Start:  07/29/24    Expected End:  09/02/24            Patient will be able to perform 5x Sit to  10 seconds or less.       Start:  07/29/24    Expected End:  09/02/24            Patient will improve his score on the Tinetti Tool to 18 or less.       Start:  07/29/24    Expected End:  09/02/24            Patient will maintain tandem stance for at least 20 sec with no upper extremity support.       Start:  07/29/24    Expected End:  09/02/24            Patient will achieve bilateral hip flexion strength of at least 4+/5       Start:  07/29/24    Expected End:  09/02/24            Patient will achieve bilateral hip abduction strength of at least 4+/5       Start:  07/29/24    Expected End:  09/02/24            Patient will demonstrate independence in home program for support of progression       Start:  07/29/24    Expected End:  08/12/24            Patient will show a significant change in AM-PAC short form (67 to 77) patient reported outcome tool to  demonstrate subjective imporovement       Start:  07/29/24    Expected End:  09/02/24                 Vishal Hazel, PTA

## 2024-08-21 ENCOUNTER — TREATMENT (OUTPATIENT)
Dept: PHYSICAL THERAPY | Facility: HOSPITAL | Age: 84
End: 2024-08-21
Payer: MEDICARE

## 2024-08-21 DIAGNOSIS — R53.1 WEAKNESS: ICD-10-CM

## 2024-08-21 PROCEDURE — 97110 THERAPEUTIC EXERCISES: CPT | Mod: GP,CQ

## 2024-08-21 PROCEDURE — 97112 NEUROMUSCULAR REEDUCATION: CPT | Mod: GP,CQ

## 2024-08-21 ASSESSMENT — PAIN - FUNCTIONAL ASSESSMENT: PAIN_FUNCTIONAL_ASSESSMENT: 0-10

## 2024-08-21 ASSESSMENT — PAIN SCALES - GENERAL: PAINLEVEL_OUTOF10: 0 - NO PAIN

## 2024-08-21 NOTE — PROGRESS NOTES
"  Physical Therapy Treatment    Patient Name: Terrance Armijo  MRN: [id number]  Today's Date: 8/21/2024  Time Calculation  Start Time: 0830  Stop Time: 0910  Time Calculation (min): 40 min        PT Therapeutic Procedures Time Entry  Neuromuscular Re-Education Time Entry: 18  Therapeutic Exercise Time Entry: 22                Current Problem  1. Weakness  Follow Up In Physical Therapy          General  Reason for Referral: weakness  Referred By: Laura LEWIS  Past Medical History Relevant to Rehab:  Basal cell carcinoma of skin of unspecified parts of face 12/24/2013    Basal cell carcinoma of skin of face   Dermatitis 02/02/2023   Essential (primary) hypertension 11/30/2022    Benign hypertension   Gastro-esophageal reflux disease without esophagitis 09/27/2016    Gastroesophageal reflux disease   Melanocytic nevi of trunk 10/12/2022   Neoplasm of uncertain behavior of skin 10/12/2022   Other conditions influencing health status 06/09/2017    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 States no episodes with LOB, feels \"wobbly\" once in awhile.     Performing HEP?: Yes    Precautions  Precautions  Medical Precautions: No known precautions/limitation  Pain  Pain Assessment: 0-10  0-10 (Numeric) Pain Score: 0 - No pain    Objective       Treatments:    Therapeutic Exercise  Therapeutic Exercise Performed: Yes  Therapeutic Exercise Activity 1: Nu Step Level 4 Seat 7  Therapeutic Exercise Activity 2: Lateral Step Ups 4\" x20 R/L  Therapeutic Exercise Activity 3: STS with OH Press 4# x15  Therapeutic Exercise Activity 4: side step Yellow 6'x4  Therapeutic Exercise Activity 5: Standing Hip Extension Yellow x10 R/L  Therapeutic Exercise Activity 7: Split Stance Rows Double Green x20    Balance/Neuromuscular Re-Education  Balance/Neuromuscular " "Re-Education Activity Performed: Yes  Balance/Neuromuscular Re-Education Activity 1: Step and Reach with head turns x10 R/L  Balance/Neuromuscular Re-Education Activity 2: Airex NBOS EC 45\"  Balance/Neuromuscular Re-Education Activity 3: Airex Romberg 45\" each  Balance/Neuromuscular Re-Education Activity 4: Tandem stance 30\" R/L  Balance/Neuromuscular Re-Education Activity 6: Airex Chops 4# x15                        EDUCATION:   Individual(s) Educated: Patient  Education Provided: HEP  Risk and Benefits Discussed with Patient/Caregiver/Other: Yes   Patient/Caregiver Demonstrated Understanding: Yes   Patient Response to Education: Patient/Caregiver verbalized understanding of information    Assessment: Pt was unsteady at time with dynamic balance activities however able to recover without assistance. Pt demonstrates good tolerance with all strengthening and stabilization exercises with good technique and no discomfort.   PT Assessment  PT Assessment Results: Decreased strength, Impaired balance  Rehab Prognosis: Good    Plan: Continue to progress current POC as tolerated to facilitate ability to perform functional activities.   OP PT Plan  PT Plan: Skilled PT  PT Frequency: 1 time per week  Duration: 5 weeks  Onset Date: 07/19/24  Certification Period Start Date: 07/29/24  Certification Period End Date: 09/02/24  Number of Treatments Authorized: Visit 4    Goals:  Active       PT Problem       Patient will demonstrate improved hamstring flexibility in bilateral lower extremities WFL.       Start:  07/29/24    Expected End:  09/02/24            Patient will be able to perform 5x Sit to  10 seconds or less.       Start:  07/29/24    Expected End:  09/02/24            Patient will improve his score on the Tinetti Tool to 18 or less.       Start:  07/29/24    Expected End:  09/02/24            Patient will maintain tandem stance for at least 20 sec with no upper extremity support.       Start:  07/29/24    " Expected End:  09/02/24            Patient will achieve bilateral hip flexion strength of at least 4+/5       Start:  07/29/24    Expected End:  09/02/24            Patient will achieve bilateral hip abduction strength of at least 4+/5       Start:  07/29/24    Expected End:  09/02/24            Patient will demonstrate independence in home program for support of progression       Start:  07/29/24    Expected End:  08/12/24            Patient will show a significant change in AM-PAC short form (67 to 77) patient reported outcome tool to demonstrate subjective imporovement       Start:  07/29/24    Expected End:  09/02/24                 Vishal Hazel, PTA

## 2024-08-27 ENCOUNTER — TREATMENT (OUTPATIENT)
Dept: PHYSICAL THERAPY | Facility: HOSPITAL | Age: 84
End: 2024-08-27
Payer: MEDICARE

## 2024-08-27 DIAGNOSIS — R53.1 WEAKNESS: Primary | ICD-10-CM

## 2024-08-27 PROCEDURE — 97110 THERAPEUTIC EXERCISES: CPT | Mod: GP | Performed by: PHYSICAL THERAPIST

## 2024-08-27 ASSESSMENT — PAIN SCALES - GENERAL: PAINLEVEL_OUTOF10: 0 - NO PAIN

## 2024-08-27 ASSESSMENT — PAIN - FUNCTIONAL ASSESSMENT: PAIN_FUNCTIONAL_ASSESSMENT: 0-10

## 2024-08-27 NOTE — PROGRESS NOTES
Physical Therapy Treatment and Discharge     Patient Name: Terrance Armijo  MRN: [id number]  Today's Date: 8/27/2024  Time Calculation  Start Time: 0918  Stop Time: 0956  Time Calculation (min): 38 min        PT Therapeutic Procedures Time Entry  Therapeutic Exercise Time Entry: 38        Current Problem  1. Weakness  Follow Up In Physical Therapy        Problem List Items Addressed This Visit             ICD-10-CM    Weakness - Primary R53.1       General  Reason for Referral: weakness  Referred By: Laura LEWIS  Past Medical History Relevant to Rehab:  Basal cell carcinoma of skin of unspecified parts of face 12/24/2013    Basal cell carcinoma of skin of face   Dermatitis 02/02/2023   Essential (primary) hypertension 11/30/2022    Benign hypertension   Gastro-esophageal reflux disease without esophagitis 09/27/2016    Gastroesophageal reflux disease   Melanocytic nevi of trunk 10/12/2022   Neoplasm of uncertain behavior of skin 10/12/2022   Other conditions influencing health status 06/09/2017    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    Performing HEP?: Yes    Precautions  Precautions  Medical Precautions: No known precautions/limitation    Pain  Pain Assessment: 0-10  0-10 (Numeric) Pain Score: 0 - No pain    Objective     Hip AROM  R hip flexion: (125°): WFL  L hip flexion: (125°): WFL  R hip abduction: (45°): WFL  L hip abduction: (45°): WFL  R hip ER: (45°): WFL  L hip ER: (45°): WFL  R hip IR: (45°): WFL  L hip IR: (45°): WFL    Specific Lower Extremity MMT  R Iliopsoas: (5/5): 4+/5  L Iliopsoas: (5/5): 4+/58  R Gluteals (sidelying): (5/5): 4/5  L Gluteals (sidelying): (5/5): 4+/5    Flexibility  R hamstrings: min restriction  L hamstrings: min restriction    Outcome Measures:  Tinetti  Sitting Balance: Steady, safe  Arises:  "Able without using arms  Attempts to Arise: Able to arise, one attempt  Immediate Standing Balance (First 5 Seconds): Steady without walker or other support  Standing Balance: Narrow stance without support  Nudged: Steady without walker or other support  Eyes Closed: Steady  Turned 360 Degrees: Steadiness: Steady  Turned 360 Degrees: Continuity of Steps: Continuous  Sitting Down: Safe, smooth motion  Balance Score: 16  Initiation of Gait: No hesitancy  Step Height: R Swing Foot: Right foot complete clears floor  Step Length: R Swing Foot: Passes left stance foot  Step Height: L Swing Foot: Left foot complete clears floor  Step Length: L Swing Foot: Passes right stance foot  Step Symmetry: Right and left step appear equal  Step Continuity: Steps appear continuous  Path: Mild/moderate deviation or uses walking aid  Trunk: No sway but flexion of knees or back or spreads arms out while walking  Walking Time: Heels almost touching while walking  Gait Score: 10  Total Score: 26    Other Measures  5x Sit to Stand: 9.09  Other Outcome Measures: AM-PAC short form 69    Treatments:  Therapeutic Exercise  Therapeutic Exercise Performed: Yes  Therapeutic Exercise Activity 1: Nu Step Level 4 Seat 7  Therapeutic Exercise Activity 2: Lateral Step Ups 4\" x20 R/L  Therapeutic Exercise Activity 5: Standing Hip Extension Yellow x10 R/L  Therapeutic Exercise Activity 7: Split Stance Rows Double Green x20    Balance/Neuromuscular Re-Education  Balance/Neuromuscular Re-Education Activity 4: Tandem stance 30\" R/L    EDUCATION:   Individual(s) Educated: Patient  Education Provided: yes  Handout(s) Provided: Scanned into chart  Home Program: reviewed home exercise program   Risk and Benefits Discussed with Patient/Caregiver/Other: Yes   Patient/Caregiver Demonstrated Understanding: Yes   Patient Response to Education: Patient/Caregiver verbalized understanding of information and Patient/Caregiver performed return demonstration of " exercises/activities    Assessment: Patient has met most of his physical therapy goals. He demonstrated improved hamstring flexibility and hip strength, but did not meet those goals. Patient also demonstrated improvement in his AM-PAC score. Patient is appropriate to discharge from physical therapy at this time. Patient agrees with plan.  PT Assessment  Rehab Prognosis: Good  Evaluation/Treatment Tolerance: Patient tolerated treatment well    Plan: Continue with POC. Discharge physical therapy.  OP PT Plan  Treatment/Interventions: Education/ Instruction, Gait training, Neuromuscular re-education, Manual therapy, Therapeutic activities, Therapeutic exercises  PT Plan: No Additional PT interventions required at this time  Onset Date: 07/19/24  Certification Period Start Date: 07/29/24  Certification Period End Date: 09/02/24  Number of Treatments Authorized: 5 of 5  Rehab Potential: Good  Plan of Care Agreement: Patient    Goals:  Active       PT Problem       Patient will demonstrate improved hamstring flexibility in bilateral lower extremities WFL. (Progressing)       Start:  07/29/24    Expected End:  09/02/24            Patient will be able to perform 5x Sit to  10 seconds or less. (Met)       Start:  07/29/24    Expected End:  09/02/24    Resolved:  08/27/24         Patient will maintain tandem stance for at least 20 sec with no upper extremity support. (Met)       Start:  07/29/24    Expected End:  09/02/24    Resolved:  08/27/24         Patient will achieve bilateral hip flexion strength of at least 4+/5 (Met)       Start:  07/29/24    Expected End:  09/02/24    Resolved:  08/27/24         Patient will achieve bilateral hip abduction strength of at least 4+/5 (Progressing)       Start:  07/29/24    Expected End:  09/02/24            Patient will demonstrate independence in home program for support of progression (Met)       Start:  07/29/24    Expected End:  08/12/24    Resolved:  08/27/24          Patient will show a significant change in AM-PAC short form (67 to 77) patient reported outcome tool to demonstrate subjective imporovement (Progressing)       Start:  07/29/24    Expected End:  09/02/24                 Hadley Washington, PT

## 2024-08-29 ENCOUNTER — PATIENT OUTREACH (OUTPATIENT)
Dept: PRIMARY CARE | Facility: CLINIC | Age: 84
End: 2024-08-29
Payer: MEDICARE

## 2024-09-20 ENCOUNTER — LAB (OUTPATIENT)
Dept: LAB | Facility: LAB | Age: 84
End: 2024-09-20
Payer: MEDICARE

## 2024-09-20 DIAGNOSIS — E78.5 DYSLIPIDEMIA: ICD-10-CM

## 2024-09-20 LAB
ALBUMIN SERPL BCP-MCNC: 4.1 G/DL (ref 3.4–5)
ALP SERPL-CCNC: 32 U/L (ref 33–136)
ALT SERPL W P-5'-P-CCNC: 16 U/L (ref 10–52)
ANION GAP SERPL CALC-SCNC: 14 MMOL/L (ref 10–20)
AST SERPL W P-5'-P-CCNC: 20 U/L (ref 9–39)
BILIRUB SERPL-MCNC: 0.8 MG/DL (ref 0–1.2)
BUN SERPL-MCNC: 18 MG/DL (ref 6–23)
CALCIUM SERPL-MCNC: 9.3 MG/DL (ref 8.6–10.3)
CHLORIDE SERPL-SCNC: 105 MMOL/L (ref 98–107)
CHOLEST SERPL-MCNC: 131 MG/DL (ref 0–199)
CHOLESTEROL/HDL RATIO: 2.4
CO2 SERPL-SCNC: 23 MMOL/L (ref 21–32)
CREAT SERPL-MCNC: 1.12 MG/DL (ref 0.5–1.3)
EGFRCR SERPLBLD CKD-EPI 2021: 65 ML/MIN/1.73M*2
ERYTHROCYTE [DISTWIDTH] IN BLOOD BY AUTOMATED COUNT: 13.9 % (ref 11.5–14.5)
GLUCOSE SERPL-MCNC: 109 MG/DL (ref 74–99)
HCT VFR BLD AUTO: 49.3 % (ref 41–52)
HDLC SERPL-MCNC: 55.5 MG/DL
HGB BLD-MCNC: 15.8 G/DL (ref 13.5–17.5)
LDLC SERPL CALC-MCNC: 43 MG/DL
MCH RBC QN AUTO: 30.7 PG (ref 26–34)
MCHC RBC AUTO-ENTMCNC: 32 G/DL (ref 32–36)
MCV RBC AUTO: 96 FL (ref 80–100)
NON HDL CHOLESTEROL: 76 MG/DL (ref 0–149)
NRBC BLD-RTO: 0 /100 WBCS (ref 0–0)
PLATELET # BLD AUTO: 214 X10*3/UL (ref 150–450)
POTASSIUM SERPL-SCNC: 4.5 MMOL/L (ref 3.5–5.3)
PROT SERPL-MCNC: 6.6 G/DL (ref 6.4–8.2)
RBC # BLD AUTO: 5.14 X10*6/UL (ref 4.5–5.9)
SODIUM SERPL-SCNC: 137 MMOL/L (ref 136–145)
TRIGL SERPL-MCNC: 164 MG/DL (ref 0–149)
TSH SERPL-ACNC: 2.07 MIU/L (ref 0.44–3.98)
VLDL: 33 MG/DL (ref 0–40)
WBC # BLD AUTO: 6.6 X10*3/UL (ref 4.4–11.3)

## 2024-09-20 PROCEDURE — 36415 COLL VENOUS BLD VENIPUNCTURE: CPT

## 2024-09-25 ENCOUNTER — APPOINTMENT (OUTPATIENT)
Dept: PRIMARY CARE | Facility: CLINIC | Age: 84
End: 2024-09-25
Payer: MEDICARE

## 2024-09-25 VITALS
WEIGHT: 154 LBS | HEIGHT: 65 IN | HEART RATE: 65 BPM | OXYGEN SATURATION: 95 % | SYSTOLIC BLOOD PRESSURE: 139 MMHG | TEMPERATURE: 97.6 F | BODY MASS INDEX: 25.66 KG/M2 | DIASTOLIC BLOOD PRESSURE: 84 MMHG

## 2024-09-25 DIAGNOSIS — Z23 NEED FOR INFLUENZA VACCINATION: ICD-10-CM

## 2024-09-25 DIAGNOSIS — R35.0 URINE FREQUENCY: Primary | ICD-10-CM

## 2024-09-25 DIAGNOSIS — Z01.818 PREOPERATIVE CLEARANCE: ICD-10-CM

## 2024-09-25 LAB
POC APPEARANCE, URINE: CLEAR
POC BILIRUBIN, URINE: NEGATIVE
POC BLOOD, URINE: NEGATIVE
POC COLOR, URINE: YELLOW
POC GLUCOSE, URINE: NEGATIVE MG/DL
POC KETONES, URINE: NEGATIVE MG/DL
POC LEUKOCYTES, URINE: NEGATIVE
POC NITRITE,URINE: NEGATIVE
POC PH, URINE: 6.5 PH
POC PROTEIN, URINE: NEGATIVE MG/DL
POC SPECIFIC GRAVITY, URINE: 1.01
POC UROBILINOGEN, URINE: 0.2 EU/DL

## 2024-09-25 PROCEDURE — 1159F MED LIST DOCD IN RCRD: CPT | Performed by: FAMILY MEDICINE

## 2024-09-25 PROCEDURE — 1157F ADVNC CARE PLAN IN RCRD: CPT | Performed by: FAMILY MEDICINE

## 2024-09-25 PROCEDURE — 3079F DIAST BP 80-89 MM HG: CPT | Performed by: FAMILY MEDICINE

## 2024-09-25 PROCEDURE — 90662 IIV NO PRSV INCREASED AG IM: CPT | Performed by: FAMILY MEDICINE

## 2024-09-25 PROCEDURE — 1036F TOBACCO NON-USER: CPT | Performed by: FAMILY MEDICINE

## 2024-09-25 PROCEDURE — 81003 URINALYSIS AUTO W/O SCOPE: CPT | Performed by: FAMILY MEDICINE

## 2024-09-25 PROCEDURE — 3075F SYST BP GE 130 - 139MM HG: CPT | Performed by: FAMILY MEDICINE

## 2024-09-25 PROCEDURE — G0008 ADMIN INFLUENZA VIRUS VAC: HCPCS | Performed by: FAMILY MEDICINE

## 2024-09-25 PROCEDURE — 99213 OFFICE O/P EST LOW 20 MIN: CPT | Performed by: FAMILY MEDICINE

## 2024-09-25 NOTE — PROGRESS NOTES
"Subjective   Patient ID: Terrance Armijo is a 84 y.o. male who presents for Procedure (Eye surgery. Constantin Santiago. [facility]. VITRECTOMY MECHANICAL 25 G PARS PLANA APPROACH. 10/01/2024.).    HPI  Here for surgical clearance for eye surgery (vitrectomy) on 10/1. Follows with cardiology, just seen a few weeks ago, to follow up in 6 months.   No trouble with anesthesia. Does snore but no apneic episodes.   Had preoperative blood work.   Does have increased urinary frequency, has seen urologist. Urine negative for infection.   Patient cleared to proceed with surgery    Review of Systems  General: no fever  Eyes: see HPI  ENT: no sore throat, no ear pain  Resp: no cough, sob or wheezing  Cardio: no chest pain, no palpitations  Abd: no nausea/vomiting  : see HPI      /84   Pulse 65   Temp 36.4 °C (97.6 °F)   Ht 1.651 m (5' 5\")   Wt 69.9 kg (154 lb)   SpO2 95%   BMI 25.63 kg/m²       Objective   Physical Exam  Gen: NAD, alert  Head: normocephalic/atraumatic  Eyes: conjunctivae normal  Ears: canals clear bilaterally, TM normal   Nose: external nose normal   Oropharynx: clear   Resp: Clear to auscultation  CVS: Regular rate and rhythm  Abdomen: soft, NT, ND  Ext: no edema, NT of lower extremities  Neuro: gait normal       Assessment/Plan   Problem List Items Addressed This Visit    None  Visit Diagnoses       Urine frequency    -  Primary    Relevant Orders    POCT UA Automated manually resulted (Completed)    Need for influenza vaccination        Relevant Orders    Flu vaccine, trivalent, preservative free, HIGH-DOSE, age 65y+ (Fluzone) (Completed)    Preoperative clearance      Patient cleared to proceed with surgery               "

## 2024-10-15 ENCOUNTER — APPOINTMENT (OUTPATIENT)
Dept: DERMATOLOGY | Facility: CLINIC | Age: 84
End: 2024-10-15
Payer: MEDICARE

## 2024-10-15 DIAGNOSIS — D48.5 NEOPLASM OF UNCERTAIN BEHAVIOR OF SKIN: ICD-10-CM

## 2024-10-15 DIAGNOSIS — L82.1 SEBORRHEIC KERATOSIS: ICD-10-CM

## 2024-10-15 DIAGNOSIS — L80 VITILIGO: ICD-10-CM

## 2024-10-15 DIAGNOSIS — C44.92 SQUAMOUS CELL CARCINOMA OF SKIN: Primary | ICD-10-CM

## 2024-10-15 DIAGNOSIS — D18.01 HEMANGIOMA OF SKIN: ICD-10-CM

## 2024-10-15 DIAGNOSIS — D22.9 MULTIPLE BENIGN NEVI: ICD-10-CM

## 2024-10-15 DIAGNOSIS — Z12.83 SCREENING EXAM FOR SKIN CANCER: ICD-10-CM

## 2024-10-15 DIAGNOSIS — L81.4 LENTIGO: ICD-10-CM

## 2024-10-15 DIAGNOSIS — L43.0 HYPERTROPHIC LICHEN PLANUS: ICD-10-CM

## 2024-10-15 RX ORDER — TACROLIMUS 1 MG/G
1 OINTMENT TOPICAL 2 TIMES DAILY
Qty: 100 G | Refills: 11 | Status: SHIPPED | OUTPATIENT
Start: 2024-10-15

## 2024-10-15 ASSESSMENT — ITCH NUMERIC RATING SCALE: HOW SEVERE IS YOUR ITCHING?: 0

## 2024-10-15 ASSESSMENT — DERMATOLOGY QUALITY OF LIFE (QOL) ASSESSMENT
ARE THERE EXCLUSIONS OR EXCEPTIONS FOR THE QUALITY OF LIFE ASSESSMENT: NO
RATE HOW BOTHERED YOU ARE BY EFFECTS OF YOUR SKIN PROBLEMS ON YOUR ACTIVITIES (EG, GOING OUT, ACCOMPLISHING WHAT YOU WANT, WORK ACTIVITIES OR YOUR RELATIONSHIPS WITH OTHERS): 0 - NEVER BOTHERED
DATE THE QUALITY-OF-LIFE ASSESSMENT WAS COMPLETED: 67128
RATE HOW EMOTIONALLY BOTHERED YOU ARE BY YOUR SKIN PROBLEM (FOR EXAMPLE, WORRY, EMBARRASSMENT, FRUSTRATION): 0 - NEVER BOTHERED
RATE HOW BOTHERED YOU ARE BY SYMPTOMS OF YOUR SKIN PROBLEM (EG, ITCHING, STINGING BURNING, HURTING OR SKIN IRRITATION): 0 - NEVER BOTHERED

## 2024-10-15 ASSESSMENT — DERMATOLOGY PATIENT ASSESSMENT
DO YOU USE A TANNING BED: NO
HAVE YOU HAD OR DO YOU HAVE VASCULAR DISEASE: NO
ARE YOU AN ORGAN TRANSPLANT RECIPIENT: NO
DO YOU HAVE ANY NEW OR CHANGING LESIONS: NO
HAVE YOU HAD OR DO YOU HAVE A STAPH INFECTION: NO
DO YOU USE SUNSCREEN: OCCASIONALLY

## 2024-10-15 ASSESSMENT — PATIENT GLOBAL ASSESSMENT (PGA): PATIENT GLOBAL ASSESSMENT: PATIENT GLOBAL ASSESSMENT:  1 - CLEAR

## 2024-10-15 NOTE — PROGRESS NOTES
Subjective     Terrance Armijo is a 84 y.o. male with history of hypertrophic lichen planus and vitiligo who presents for the following: Skin Check (FBSE, spot on left cheek of concern). [facility] 10/11/2023.    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 He was previously treated with ILK, fluocinonide 0.05% ointment up to twice weekly under occlusion, and tacrolimus 0.1% ointment BID PRN with good response. At last visit, patient reported good control with tacrolimus 0.1% ointment and was continued on this topical treatment. Additionally, at last visit, he prescribed ruxolitinib to apply to the depigmented patches on his bilateral dorsal hands, forearms, chest, back, and bilateral anterior lower legs for vitiligo. However, he was not able to get this medication.    Today, patient complains of a spot on his left cheek that he noticed a month ago. He reports that there was initially a scab there but it crusted and fell off;  now there is a pink patch. It does not itch or bleed, and it is not painful. He notes that his hypertrophic lichen planus has resolved and he has not had to apply tacrolimus ointment in over a year. He believes that the pink spots on his arms are due to trauma. Denies pruritus. Additionally, he has not had worsening of his vitiligo. Otherwise, patient denies any new, changing, or concerning skin lesions since last visit; no bleeding, itching, or burning lesions.      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     Review of Systems:  No other skin or systemic complaints other than what is documented elsewhere in the note.    The following portions of the chart were reviewed this encounter and updated as appropriate:         Skin Cancer History  No skin cancer on file.      Specialty Problems          Dermatology Problems    History of basal cell carcinoma of skin     Of face         Psoriasis        Objective    Well appearing patient in no apparent distress; mood and affect are within normal limits.    A full examination was performed including scalp, head, eyes, ears, nose, lips, neck, chest, axillae, abdomen, back, buttocks, bilateral upper extremities, bilateral lower extremities, hands, feet, fingers, toes, fingernails, and toenails. All findings within normal limits unless otherwise noted below.    Assessment/Plan   1. Vitiligo  Depigmented patches of the scalp, posterior neck, bilateral dorsal hands, bilateral dorsal forearms, chest, back, and bilateral anterior lower legs.     -Discussed that vitiligo is a chronic autoimmune disorder that causes skin to lose pigment/color.   -Patient states that he is somewhat bothered by depigmented patches and would like to try a topical. He was unable to get ruxolitinib previously.  -Start tacrolimus 0.1% ointment BID. Discussed risks, benefits, and side effects.    Related Medications  tacrolimus (Protopic) 0.1 % ointment  Apply 1 Application topically 2 times a day. Apply and gently massage into affected area(s) twice daily.    2. Neoplasm of uncertain behavior of skin  Left Buccal Cheek  2 x 1cm pink rough atrophic plaque              Lesion biopsy  Type of biopsy: tangential    Informed consent: discussed and consent obtained    Timeout: patient name, date of birth, surgical site, and procedure verified    Procedure prep:  Patient was prepped and draped  Anesthesia: the lesion was anesthetized in a standard fashion    Anesthetic:  1% lidocaine w/ epinephrine 1-100,000 local infiltration  Instrument used: DermaBlade    Hemostasis achieved with: aluminum chloride    Outcome: patient tolerated procedure well    Post-procedure details: sterile dressing applied and wound care instructions given    Dressing type: petrolatum and bandage      Specimen 1 - Dermatopathology- DERM LAB  Differential Diagnosis: seborrheic keratosis vs HAK vs SCC  Check Margins Yes/No?:    Comments:     Dermpath Lab: Routine Histopathology (formalin-fixed tissue)    3. Multiple benign nevi  Scattered, uniform and benign-appearing, regular brown melanocytic papules and macules.    - Discussed benign nature and that no treatment is necessary unless it becomes painful or increases in size. Patient opts for clinical monitoring at this time.     4. Lentigo  Scattered tan macules in sun-exposed areas.    - Discussed benign nature and that no treatment is necessary unless it becomes painful or increases in size. Patient opts for clinical monitoring at this time.     5. Hemangioma of skin  Scattered cherry-red papule(s).    - Discussed benign nature and that no treatment is necessary unless it becomes painful or increases in size. Patient opts for clinical monitoring at this time.     6. Seborrheic keratosis  Stuck on verrucous, tan-brown papules and plaques.    - Discussed benign nature and that no treatment is necessary unless it becomes painful or increases in size. Patient opts for clinical monitoring at this time.     7. Hypertrophic lichen planus  Left Popliteal Fossa, Right Popliteal Fossa  No purple papules or plaques on examination    Hypertrophic LP likely secondary to carvedilol vs idiopathic, no active lesions on exam today  -Previously treated with ILK, fluocinonide 0.05% ointment up to twice weekly under occlusion, and tacrolimus 0.1% ointment BID PRN.   -Patient reports not having to use tacrolimus 0.1% ointment over the past year  -Discussed that patient can use tacrolimus 0.1% ointment BID on areas if they develop. Refilled medication.    Related Medications  tacrolimus (Protopic) 0.1 % ointment  Apply 1 Application topically 2 times a day. Apply and gently massage into affected area(s) twice daily.    8. Screening exam for skin cancer    Related Procedures  Follow Up In Dermatology - Established Patient  Follow Up In Dermatology - Established Patient      Amelie Galdamez MD  PGY-2, Dermatology     I saw and  evaluated the patient. I personally obtained the key and critical portions of the history and physical exam or was physically present for key and critical portions performed by the resident/fellow. I reviewed the resident/fellow's documentation and discussed the patient with the resident/fellow. I agree with the resident/fellow's medical decision making as documented in the note and made changes where appropriate.

## 2024-10-15 NOTE — PATIENT INSTRUCTIONS
Thank you for visiting with  Dermatology today!    At your visit today, we performed your yearly skin check. We biopsied the spot on your left cheek to rule out a skin cancer. We will notify you of the results in 1-2 weeks on MyChart. Additionally, we discussed that your lichen planus is quiet today so you do not need any treatment for that. For your vitiligo (meaning the light spots on your body), we talked about trying a topical treatment called tacrolimus - this is the same ointment that you used on your lichen planus. We have sent you a new prescription. You may trial tacrolimus 0.1% ointment twice a day on your hands for at least 2 months to see if there is any improvement.    For care of your biopsy spot:  - Keep the current band aid in place for 24 hours  - After 24 hours, remove the band aid and wash with gentle soap and water  - Replace vaseline and a band aid over the site  - Repeat the cycle of washing and covering with vaseline/band aid daily until the site heals / sutures are removed in 10-14 days

## 2024-10-17 LAB
LABORATORY COMMENT REPORT: NORMAL
PATH REPORT.FINAL DX SPEC: NORMAL
PATH REPORT.GROSS SPEC: NORMAL
PATH REPORT.MICROSCOPIC SPEC OTHER STN: NORMAL
PATH REPORT.RELEVANT HX SPEC: NORMAL
PATH REPORT.TOTAL CANCER: NORMAL

## 2024-10-25 NOTE — PROGRESS NOTES
"Subjective   Reason for Visit: Terrance Armijo is an 82 y.o. male here for a Medicare Wellness visit.               HPI  Here for medicare annual  Was started on repatha  by cardiology to help with HLD  Following with eye doctor for sudden onset vision loss, work up was negative. Started on steroid drops, and have been helping significantly, on taper dose      Patient Care Team:  Isaura Chacko MD as PCP - General  Isaura Chacko MD as PCP - [employer] PCP     Review of Systems  General: no fever  Eyes: no blurry vision  ENT: no sore throat, no ear pain  Resp: no cough, sob or wheezing  Cardio: no chest pain, no palpitations  Abd: no nausea/vomiting  : no dysuria, no increased urinary frequency      Objective   Vitals:  /60   Pulse 67   Temp 36.4 °C (97.6 °F)   Ht 1.626 m (5' 4\")   Wt 76.5 kg (168 lb 9.6 oz)   SpO2 95%   BMI 28.94 kg/m²       Physical Exam  Gen: NAD, alert  Head: normocephalic/atraumatic  Eyes: conjunctivae normal  Ears: canals clear bilaterally, TM normal   Nose: Deferred due to covid precautions, wearing mask  Oropharynx: Deferred due to covid precautions, wearing mask  Resp: Clear to auscultation  CVS: Regular rate and rhythm  Abdomen: soft, NT, ND  Ext: no edema, NT of lower extremities         Assessment/Plan   Problem List Items Addressed This Visit       Congestive heart failure (CMS/HCC)    Current Assessment & Plan     Continue follow up with cardiology          Acute iritis     Other Visit Diagnoses       Medicare annual wellness visit, subsequent    -  Primary    BMI 28.0-28.9,adult                   " ESRD on hemodialysis

## 2024-11-04 ENCOUNTER — APPOINTMENT (OUTPATIENT)
Dept: DERMATOLOGY | Facility: CLINIC | Age: 84
End: 2024-11-04
Payer: MEDICARE

## 2024-11-04 VITALS — SYSTOLIC BLOOD PRESSURE: 140 MMHG | HEART RATE: 70 BPM | DIASTOLIC BLOOD PRESSURE: 76 MMHG

## 2024-11-04 DIAGNOSIS — C44.329 SQUAMOUS CELL CANCER OF SKIN OF LEFT CHEEK: ICD-10-CM

## 2024-11-04 PROCEDURE — 99214 OFFICE O/P EST MOD 30 MIN: CPT | Performed by: DERMATOLOGY

## 2024-11-04 PROCEDURE — 17311 MOHS 1 STAGE H/N/HF/G: CPT | Performed by: DERMATOLOGY

## 2024-11-04 PROCEDURE — 13132 CMPLX RPR F/C/C/M/N/AX/G/H/F: CPT | Performed by: DERMATOLOGY

## 2024-11-04 NOTE — PROGRESS NOTES
Mohs Surgery Operative Note    Date of Surgery:  11/4/2024  Surgeon:  An Bryan MD  Office Location:  [address] [facility]  [address] [facility] [address]  [place] [address]  Dept: [phone]  Dept Fax: [phone]  Referring Provider: Jose Sanchez MD  [id number] [facility], [facility]  [place] [address]      Assessment/Plan   Pre-procedure:   Obtained informed consent: written from patient  The surgical site was identified and confirmed with the patient.     Intra-operative:   Audible time out called at : 8:36 AM 11/04/24  by: Janessa Myrick RN   Verified patient name, birthdate, site, specimen bottle label & requisition.    The planned procedure(s) was again reviewed with the patient. The risks of bleeding, infection, nerve damage and scarring were reviewed. Written authorization was obtained. The patient identity, surgical site, and planned procedure(s) were verified. The provider acted as both surgeon and pathologist.     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  Left Buccal Cheek  Mohs surgery  Consent obtained: written    Universal Protocol:  Procedure explained and questions answered to patient or proxy's satisfaction: Yes    Test results available and properly labeled: Yes    Pathology report reviewed: Yes    External notes reviewed: Yes    Photo or diagram used for site identification: Yes    Site/side marked: Yes    Slide independently reviewed by Mohs surgeon: Yes    Immediately prior to procedure a time out was called: Yes    Patient identity confirmed: verbally with patient  Preparation: Patient was prepped and draped in usual sterile fashion      Anticoagulation:  Is the patient taking prescription anticoagulant and/or aspirin prescribed/recommended by a physician? Yes    Was the anticoagulation regimen changed prior to Mohs? No      Anesthesia:  Anesthesia method: local infiltration  Local anesthetic: lidocaine 2% WITH epi    Procedure Details:  Case  ID Number: [id number]  Biopsy accession number: [id number]  Date of biopsy: 10/15/2024  Pre-Op diagnosis: squamous cell carcinoma  SCC subtype: in situ  Surgical site (from skin exam): Left Buccal Cheek  Pre-operative length (cm): 2  Pre-operative width (cm): 1.3  Indications for Mohs surgery: anatomic location where tissue conservation is critical and tumor size greater than 2 cm  Previously treated? No      Micrographic Surgery Details:  Post-operative length (cm): 2.7  Post-operative width (cm): 1.4  Number of Mohs stages: 1    Stage 1     Comments: The patient was brought into the operating room and placed in the procedure chair in the appropriate position.  The area positive by previous biopsy was identified and confirmed with the patient. The area of clinically obvious tumor was debulked using a curette and/or scalpel as needed. An incision was made following the Mohs approach through the skin. The specimen was taken to the lab, divided into 2 piece(s) and appropriately chromacoded and processed.  Tumor features identified on Mohs section: no tumor identified  Depth of defect: subcutaneous fat    Patient tolerance of procedure: tolerated well, no immediate complications    Reconstruction:  Was the defect reconstructed? Yes    Was reconstruction performed by the same Mohs surgeon? Yes    Setting of reconstruction: outpatient office  When was reconstruction performed? same day  Type of reconstruction: linear  Linear reconstruction: complex  Length of linear repair (cm): 4  Subcutaneous Layers (Deep Stitches)   Suture size:  5-0  Suture type:  Vicryl  Stitches:  Buried vertical mattress  Fine/surface layer approximation (top stitches)   Epidermal/Superficial suture size:  5-0  Epidermal/Superficial suture type:  Fast-absorbing gut  Stitches: simple running    Hemostasis achieved with: electrodesiccation  Outcome: patient tolerated procedure well with no complications    Post-procedure details: sterile dressing  applied and wound care instructions given    Dressing type: Telfa pad, pressure dressing and Hypafix      Complex Linear Repair - Wide Undermining:  Given the location and size of the defect, it was determined that a complex layered linear closure was required to restore normal anatomy and function. The repair was considered complex because extensive undermining was required and performed. The amount of undermining performed was greater than the maximum width of the defect as measured perpendicular to the closure line along at least one entire edge of the defect. Standing cutaneous cones were removed using Burow's triangles. The wound edges were brought into close approximation with buried vertical mattress sutures. The remainder of the wound was then closed with epidermal top sutures.    The final repair measured 4 cm                  Wound care was discussed, and the patient was given written post-operative wound care instructions.      The patient will follow up with An Bryan MD as needed for any post operative problems or concerns, and will follow up with their primary dermatologist as scheduled.

## 2024-11-04 NOTE — PROGRESS NOTES
Office Visit Note  Date: 11/4/2024  Surgeon:  An Bryan MD  Office Location: DO [address] [facility]  [address] [facility]  [address]  [facility] [address]  Dept: [phone]  Dept Fax: [phone]  Referring Provider: Jose Sanchez MD  [id number] [address] [address]  Department of Dermatology  [address]   Terrance Armijo is a 84 y.o. male who presents for the following: MOHS Surgery (Left Buccal Cheek- SCCIS)    According to the patient, the lesion has been present for approximately 1 month at the time of diagnosis.  The lesion is not causing symptoms.  The lesion has not been treated previously.    The patient does have a pacemaker / defibrillator.  The patient does not have a heart valve / joint replacement.    The patient is on blood thinners.  The patient does not have a history of hepatitis B or C.  The patient does not have a history of HIV.  The patient does not have a history of immunosuppression (e.g. organ transplantation, malignancy, medications)    Review of Systems:  No other skin or systemic complaints other than what is documented elsewhere in the note.    MEDICAL HISTORY: clinically relevant history including significant past medical history, medications and allergies was reviewed and documented in Epic.    Objective   Well appearing patient in no apparent distress; mood and affect are within normal limits.  Vital signs: See record.  Noted on the Left Buccal Cheek  Is a 2 x 1.3 cm scar    The patient confirmed the identified site.    Discussion:  The nature of the diagnosis was explained. The lesion is a skin cancer.  It has a risk of local growth and distant spread. The condition is associated with sun exposure.  Warning signs of non-melanoma skin cancer discussed. Patient was instructed to perform monthly self skin examination.  We recommended that the patient have regular full skin exams given an increased risk of subsequent skin cancers. The patient was  instructed to use sun protective behaviors including use of broad spectrum sunscreens and sun protective clothing to reduce risk of skin cancers.      Risks, benefits, side effects of Mohs surgery were discussed with patient and the patient voiced understanding.  It was explained that even though the cure rate of Mohs is very high it is not 100%. Risks of surgery including but not limited to bleeding, infection, numbness, nerve damage, and scar were reviewed.  Discussion included wound care requirements, activity restrictions, likely scar outcome and time to heal.     After Mohs surgery, the defect may need to be repaired surgically and the scar may be longer than the original lesion.  Reconstruction options, risks, and benefits were reviewed including second intention healing, linear repair (4-1 ratio was explained), local flaps, skin grafts, cartilage grafts and interpolation flaps (the need for multiple surgeries was explained). Possible outcomes were reviewed including likely scar appearance, failure of flap survival, infection, bleeding and the need for revision surgery.     The pathology was reviewed, the photograph was reviewed, and the referring physician's note was reviewed.    Patient elected for Mohs surgery.

## 2024-11-21 ENCOUNTER — APPOINTMENT (OUTPATIENT)
Dept: PRIMARY CARE | Facility: CLINIC | Age: 84
End: 2024-11-21
Payer: MEDICARE

## 2025-01-10 ENCOUNTER — APPOINTMENT (OUTPATIENT)
Dept: PRIMARY CARE | Facility: CLINIC | Age: 85
End: 2025-01-10
Payer: MEDICARE

## 2025-01-14 ENCOUNTER — APPOINTMENT (OUTPATIENT)
Dept: PRIMARY CARE | Facility: CLINIC | Age: 85
End: 2025-01-14
Payer: MEDICARE

## 2025-01-29 ENCOUNTER — APPOINTMENT (OUTPATIENT)
Dept: DERMATOLOGY | Facility: CLINIC | Age: 85
End: 2025-01-29
Payer: MEDICARE

## 2025-01-29 DIAGNOSIS — L90.5 SCAR CONDITIONS AND FIBROSIS OF SKIN: Primary | ICD-10-CM

## 2025-01-29 DIAGNOSIS — L57.0 ACTINIC KERATOSIS: ICD-10-CM

## 2025-01-29 DIAGNOSIS — L82.1 SEBORRHEIC KERATOSIS: ICD-10-CM

## 2025-01-29 DIAGNOSIS — D18.01 HEMANGIOMA OF SKIN: ICD-10-CM

## 2025-01-29 PROCEDURE — 17003 DESTRUCT PREMALG LES 2-14: CPT | Performed by: DERMATOLOGY

## 2025-01-29 PROCEDURE — 17000 DESTRUCT PREMALG LESION: CPT | Performed by: DERMATOLOGY

## 2025-01-29 PROCEDURE — 1157F ADVNC CARE PLAN IN RCRD: CPT | Performed by: DERMATOLOGY

## 2025-01-29 PROCEDURE — 1159F MED LIST DOCD IN RCRD: CPT | Performed by: DERMATOLOGY

## 2025-01-29 PROCEDURE — 99213 OFFICE O/P EST LOW 20 MIN: CPT | Performed by: DERMATOLOGY

## 2025-01-29 NOTE — PROGRESS NOTES
Subjective     Terrance Armijo is a 84 y.o. male who presents for the following: Skin Check (Follow up from Oct visit; had MOHS surgery 11/4/24 for CA left buccal cheek).     Patient here for spot check of left chek (site of Mohs). 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 Today, patient has no lesions of concern. No areas of pruritus, pain, or bleeding. He plans to have his next FBSE in October.    Review of Systems:  No other skin or systemic complaints other than what is documented elsewhere in the note.    The following portions of the chart were reviewed this encounter and updated as appropriate:         Skin Cancer History  Biopsy Date Type Location Status   10/15/24 SCC in Situ Left Buccal Cheek Refer Mohs/Surgeon  11/4/24       Specialty Problems          Dermatology Problems    History of basal cell carcinoma of skin     Of face         Psoriasis        Objective   Well appearing patient in no apparent distress; mood and affect are within normal limits.    A waist up examination was performed including scalp, head, eyes, ears, nose, lips, neck, chest, axillae, abdomen, back, bilateral upper extremities, hands,  fingers. All findings within normal limits unless otherwise noted below.    Assessment/Plan   1. Scar conditions and fibrosis of skin  Left Zygomatic Area  - Well-healing scar at site of prior procedure with no evidence of recurrence    - Reassurance  - Patient to have FBSE on 10/15/24    2. Actinic keratosis (3)  Left Forearm - Posterior (2), Right Forearm - Posterior  Erythematous macules with gritty scale.    Destr of lesion - Left Forearm - Posterior (2), Right Forearm - Posterior  Complexity: simple    Destruction method: cryotherapy    Informed consent: discussed and consent obtained    Lesion destroyed using liquid nitrogen: Yes    Region frozen until ice ball extended beyond lesion: Yes    Cryotherapy cycles:  1  Outcome: patient tolerated procedure well with no  complications    Post-procedure details: wound care instructions given      3. Seborrheic keratosis  Stuck on verrucous, tan-brown papules and plaques on trunk    - Discussed benign nature and that no treatment is necessary unless it becomes painful or increases in size. Patient opts for clinical monitoring at this time.     4. Hemangioma of skin  Scattered cherry-red papule(s).    - Discussed benign nature and that no treatment is necessary unless it becomes painful or increases in size. Patient opts for clinical monitoring at this time.       Follow up on 10/15/24 (already scheduled) for FBSE.    David Hobbs MD  PGY-2; Department of Dermatology    I saw and evaluated the patient. I personally obtained the key and critical portions of the history and physical exam or was physically present for key and critical portions performed by the resident/fellow. I reviewed the resident/fellow's documentation and discussed the patient with the resident/fellow. I agree with the resident/fellow's medical decision making as documented in the note and made changes where appropriate.

## 2025-01-29 NOTE — PROGRESS NOTES
Dermatology Allergy Patch Testing Visit:     Subjective   Terrance Armijo was {Referred by:[id number]}.    Terrance Armijo is a 84 y.o. male who presents for reason for visit of No chief complaint on file..                                                                                    Objective   Physical Exam    Assessment/Plan :  Response:

## 2025-02-19 ENCOUNTER — APPOINTMENT (OUTPATIENT)
Dept: PRIMARY CARE | Facility: CLINIC | Age: 85
End: 2025-02-19
Payer: MEDICARE

## 2025-02-19 VITALS
BODY MASS INDEX: 24.56 KG/M2 | HEIGHT: 65 IN | SYSTOLIC BLOOD PRESSURE: 124 MMHG | TEMPERATURE: 96.5 F | OXYGEN SATURATION: 97 % | WEIGHT: 147.4 LBS | DIASTOLIC BLOOD PRESSURE: 66 MMHG | HEART RATE: 61 BPM

## 2025-02-19 DIAGNOSIS — I42.8 NONISCHEMIC CARDIOMYOPATHY (MULTI): ICD-10-CM

## 2025-02-19 DIAGNOSIS — I50.32 CHRONIC DIASTOLIC CONGESTIVE HEART FAILURE: ICD-10-CM

## 2025-02-19 DIAGNOSIS — Z00.00 MEDICARE ANNUAL WELLNESS VISIT, SUBSEQUENT: Primary | ICD-10-CM

## 2025-02-19 DIAGNOSIS — C85.99: ICD-10-CM

## 2025-02-19 PROCEDURE — 1157F ADVNC CARE PLAN IN RCRD: CPT | Performed by: FAMILY MEDICINE

## 2025-02-19 PROCEDURE — G0439 PPPS, SUBSEQ VISIT: HCPCS | Performed by: FAMILY MEDICINE

## 2025-02-19 PROCEDURE — 1170F FXNL STATUS ASSESSED: CPT | Performed by: FAMILY MEDICINE

## 2025-02-19 PROCEDURE — 3078F DIAST BP <80 MM HG: CPT | Performed by: FAMILY MEDICINE

## 2025-02-19 PROCEDURE — 1036F TOBACCO NON-USER: CPT | Performed by: FAMILY MEDICINE

## 2025-02-19 PROCEDURE — 1160F RVW MEDS BY RX/DR IN RCRD: CPT | Performed by: FAMILY MEDICINE

## 2025-02-19 PROCEDURE — 3074F SYST BP LT 130 MM HG: CPT | Performed by: FAMILY MEDICINE

## 2025-02-19 PROCEDURE — 1159F MED LIST DOCD IN RCRD: CPT | Performed by: FAMILY MEDICINE

## 2025-02-19 RX ORDER — DIFLUPREDNATE OPHTHALMIC 0.5 MG/ML
1 EMULSION OPHTHALMIC
COMMUNITY
Start: 2025-01-21

## 2025-02-19 ASSESSMENT — ACTIVITIES OF DAILY LIVING (ADL)
MANAGING_FINANCES: INDEPENDENT
GROCERY_SHOPPING: INDEPENDENT
TAKING_MEDICATION: INDEPENDENT
DRESSING: INDEPENDENT
BATHING: INDEPENDENT
DOING_HOUSEWORK: INDEPENDENT

## 2025-02-19 ASSESSMENT — ENCOUNTER SYMPTOMS
DEPRESSION: 0
LOSS OF SENSATION IN FEET: 0
OCCASIONAL FEELINGS OF UNSTEADINESS: 0

## 2025-02-19 ASSESSMENT — PATIENT HEALTH QUESTIONNAIRE - PHQ9
2. FEELING DOWN, DEPRESSED OR HOPELESS: NOT AT ALL
SUM OF ALL RESPONSES TO PHQ9 QUESTIONS 1 AND 2: 0
1. LITTLE INTEREST OR PLEASURE IN DOING THINGS: NOT AT ALL

## 2025-02-19 NOTE — PROGRESS NOTES
"Subjective   Reason for Visit: Terrance Armijo is an 84 y.o. male here for a Medicare Wellness visit.     Past Medical, Surgical, and Family History reviewed and updated in chart.    Reviewed all medications by prescribing practitioner or clinical pharmacist (such as prescriptions, OTCs, herbal therapies and supplements) and documented in the medical record.    HPI  Here for medicare annual physical  Following with ophthalmologist for ocular lymphoma. PET scan was negative for other lesions in the body. Getting injections monthly. Vision slightly better.   Following with cardiology for CHF and cardiomyopathy.    Patient Care Team:  Isaura Chacko MD as PCP - General  Isaura Chacko MD as PCP - [employer] PCP     Review of Systems  General: no fever  Eyes:see HPI  ENT: no sore throat, no ear pain  Resp: no cough, sob or wheezing  Cardio: no chest pain, no palpitations  Abd: no nausea/vomiting  : no dysuria, no increased urinary frequency    Objective   Vitals:  /66   Pulse 61   Temp 35.8 °C (96.5 °F)   Ht 1.651 m (5' 5\")   Wt 66.9 kg (147 lb 6.4 oz)   SpO2 97%   BMI 24.53 kg/m²       Physical Exam  Gen: NAD, alert  Head: normocephalic/atraumatic  Eyes: conjunctivae normal  Ears: canals clear bilaterally, TM normal   Nose: external nose normal   Oropharynx: clear   Resp: Clear to auscultation  CVS: Regular rate and rhythm  Abdomen: soft, NT, ND  Ext: no edema, NT of lower extremities  Neuro: gait normal     Assessment & Plan  Medicare annual wellness visit, subsequent         Chronic diastolic congestive heart failure  Continue follow up with cardiology       Nonischemic cardiomyopathy (Multi)  Continue follow up with cardiology       Ocular lymphoma (Multi)  Continue follow up with ophthalmology                 "

## 2025-10-15 ENCOUNTER — APPOINTMENT (OUTPATIENT)
Dept: DERMATOLOGY | Facility: CLINIC | Age: 85
End: 2025-10-15
Payer: MEDICARE